# Patient Record
Sex: MALE | Race: WHITE | NOT HISPANIC OR LATINO | Employment: FULL TIME | URBAN - METROPOLITAN AREA
[De-identification: names, ages, dates, MRNs, and addresses within clinical notes are randomized per-mention and may not be internally consistent; named-entity substitution may affect disease eponyms.]

---

## 2017-01-20 ENCOUNTER — ALLSCRIPTS OFFICE VISIT (OUTPATIENT)
Dept: OTHER | Facility: OTHER | Age: 47
End: 2017-01-20

## 2017-01-20 DIAGNOSIS — F31.9 BIPOLAR DISORDER (HCC): ICD-10-CM

## 2017-03-17 ENCOUNTER — TRANSCRIBE ORDERS (OUTPATIENT)
Dept: LAB | Facility: CLINIC | Age: 47
End: 2017-03-17

## 2017-03-17 ENCOUNTER — APPOINTMENT (OUTPATIENT)
Dept: LAB | Facility: CLINIC | Age: 47
End: 2017-03-17
Payer: COMMERCIAL

## 2017-03-17 DIAGNOSIS — F31.9 BIPOLAR DISORDER (HCC): ICD-10-CM

## 2017-03-17 DIAGNOSIS — F31.9 BIPOLAR I DISORDER, MOST RECENT EPISODE (OR CURRENT) UNSPECIFIED: ICD-10-CM

## 2017-03-17 DIAGNOSIS — F31.9 BIPOLAR I DISORDER, MOST RECENT EPISODE (OR CURRENT) UNSPECIFIED: Primary | ICD-10-CM

## 2017-03-17 LAB
ALBUMIN SERPL BCP-MCNC: 3.8 G/DL (ref 3.5–5)
ALP SERPL-CCNC: 90 U/L (ref 46–116)
ALT SERPL W P-5'-P-CCNC: 30 U/L (ref 12–78)
ANION GAP SERPL CALCULATED.3IONS-SCNC: 9 MMOL/L (ref 4–13)
AST SERPL W P-5'-P-CCNC: 16 U/L (ref 5–45)
BASOPHILS # BLD AUTO: 0.02 THOUSANDS/ΜL (ref 0–0.1)
BASOPHILS NFR BLD AUTO: 0 % (ref 0–1)
BILIRUB SERPL-MCNC: 0.4 MG/DL (ref 0.2–1)
BUN SERPL-MCNC: 14 MG/DL (ref 5–25)
CALCIUM SERPL-MCNC: 8.6 MG/DL (ref 8.3–10.1)
CARBAMAZEPINE SERPL-MCNC: 7.3 UG/ML (ref 4–12)
CHLORIDE SERPL-SCNC: 107 MMOL/L (ref 100–108)
CHOLEST SERPL-MCNC: 242 MG/DL (ref 50–200)
CO2 SERPL-SCNC: 26 MMOL/L (ref 21–32)
CREAT SERPL-MCNC: 1.17 MG/DL (ref 0.6–1.3)
EOSINOPHIL # BLD AUTO: 0.3 THOUSAND/ΜL (ref 0–0.61)
EOSINOPHIL NFR BLD AUTO: 4 % (ref 0–6)
ERYTHROCYTE [DISTWIDTH] IN BLOOD BY AUTOMATED COUNT: 12.7 % (ref 11.6–15.1)
GFR SERPL CREATININE-BSD FRML MDRD: >60 ML/MIN/1.73SQ M
GLUCOSE P FAST SERPL-MCNC: 101 MG/DL (ref 65–99)
HCT VFR BLD AUTO: 44.9 % (ref 36.5–49.3)
HDLC SERPL-MCNC: 73 MG/DL (ref 40–60)
HGB BLD-MCNC: 14.8 G/DL (ref 12–17)
LDLC SERPL CALC-MCNC: 159 MG/DL (ref 0–100)
LITHIUM SERPL-SCNC: 0.4 MMOL/L (ref 0.5–1)
LYMPHOCYTES # BLD AUTO: 1.18 THOUSANDS/ΜL (ref 0.6–4.47)
LYMPHOCYTES NFR BLD AUTO: 17 % (ref 14–44)
MCH RBC QN AUTO: 29.7 PG (ref 26.8–34.3)
MCHC RBC AUTO-ENTMCNC: 33 G/DL (ref 31.4–37.4)
MCV RBC AUTO: 90 FL (ref 82–98)
MONOCYTES # BLD AUTO: 0.69 THOUSAND/ΜL (ref 0.17–1.22)
MONOCYTES NFR BLD AUTO: 10 % (ref 4–12)
NEUTROPHILS # BLD AUTO: 4.83 THOUSANDS/ΜL (ref 1.85–7.62)
NEUTS SEG NFR BLD AUTO: 69 % (ref 43–75)
PLATELET # BLD AUTO: 222 THOUSANDS/UL (ref 149–390)
PMV BLD AUTO: 9.7 FL (ref 8.9–12.7)
POTASSIUM SERPL-SCNC: 4.2 MMOL/L (ref 3.5–5.3)
PROT SERPL-MCNC: 7.2 G/DL (ref 6.4–8.2)
RBC # BLD AUTO: 4.99 MILLION/UL (ref 3.88–5.62)
SODIUM SERPL-SCNC: 142 MMOL/L (ref 136–145)
TRIGL SERPL-MCNC: 48 MG/DL
WBC # BLD AUTO: 7.02 THOUSAND/UL (ref 4.31–10.16)

## 2017-03-17 PROCEDURE — 85025 COMPLETE CBC W/AUTO DIFF WBC: CPT

## 2017-03-17 PROCEDURE — 36415 COLL VENOUS BLD VENIPUNCTURE: CPT

## 2017-03-17 PROCEDURE — 80053 COMPREHEN METABOLIC PANEL: CPT

## 2017-03-17 PROCEDURE — 80178 ASSAY OF LITHIUM: CPT

## 2017-03-17 PROCEDURE — 80061 LIPID PANEL: CPT

## 2017-03-17 PROCEDURE — 80156 ASSAY CARBAMAZEPINE TOTAL: CPT

## 2017-03-22 ENCOUNTER — GENERIC CONVERSION - ENCOUNTER (OUTPATIENT)
Dept: OTHER | Facility: OTHER | Age: 47
End: 2017-03-22

## 2017-04-12 ENCOUNTER — ALLSCRIPTS OFFICE VISIT (OUTPATIENT)
Dept: OTHER | Facility: OTHER | Age: 47
End: 2017-04-12

## 2017-07-05 ENCOUNTER — ALLSCRIPTS OFFICE VISIT (OUTPATIENT)
Dept: OTHER | Facility: OTHER | Age: 47
End: 2017-07-05

## 2017-09-27 ENCOUNTER — ALLSCRIPTS OFFICE VISIT (OUTPATIENT)
Dept: OTHER | Facility: OTHER | Age: 47
End: 2017-09-27

## 2017-09-27 DIAGNOSIS — F31.9 BIPOLAR DISORDER (HCC): ICD-10-CM

## 2017-12-27 ENCOUNTER — APPOINTMENT (OUTPATIENT)
Dept: LAB | Facility: CLINIC | Age: 47
End: 2017-12-27
Payer: COMMERCIAL

## 2017-12-27 ENCOUNTER — TRANSCRIBE ORDERS (OUTPATIENT)
Dept: LAB | Facility: CLINIC | Age: 47
End: 2017-12-27

## 2017-12-27 ENCOUNTER — GENERIC CONVERSION - ENCOUNTER (OUTPATIENT)
Dept: OTHER | Facility: OTHER | Age: 47
End: 2017-12-27

## 2017-12-27 DIAGNOSIS — F31.9 DEPRESSED BIPOLAR I DISORDER (HCC): Primary | ICD-10-CM

## 2017-12-27 DIAGNOSIS — F31.9 DEPRESSED BIPOLAR I DISORDER (HCC): ICD-10-CM

## 2017-12-27 DIAGNOSIS — F31.9 BIPOLAR DISORDER (HCC): ICD-10-CM

## 2017-12-27 LAB
ALBUMIN SERPL BCP-MCNC: 3.8 G/DL (ref 3.5–5)
ALP SERPL-CCNC: 76 U/L (ref 46–116)
ALT SERPL W P-5'-P-CCNC: 34 U/L (ref 12–78)
ANION GAP SERPL CALCULATED.3IONS-SCNC: 7 MMOL/L (ref 4–13)
AST SERPL W P-5'-P-CCNC: 16 U/L (ref 5–45)
BASOPHILS # BLD AUTO: 0.01 THOUSANDS/ΜL (ref 0–0.1)
BASOPHILS NFR BLD AUTO: 0 % (ref 0–1)
BILIRUB SERPL-MCNC: 0.4 MG/DL (ref 0.2–1)
BUN SERPL-MCNC: 17 MG/DL (ref 5–25)
CALCIUM SERPL-MCNC: 8.9 MG/DL (ref 8.3–10.1)
CARBAMAZEPINE SERPL-MCNC: 5.8 UG/ML (ref 4–12)
CHLORIDE SERPL-SCNC: 105 MMOL/L (ref 100–108)
CO2 SERPL-SCNC: 28 MMOL/L (ref 21–32)
CREAT SERPL-MCNC: 1.01 MG/DL (ref 0.6–1.3)
EOSINOPHIL # BLD AUTO: 0.31 THOUSAND/ΜL (ref 0–0.61)
EOSINOPHIL NFR BLD AUTO: 5 % (ref 0–6)
ERYTHROCYTE [DISTWIDTH] IN BLOOD BY AUTOMATED COUNT: 12.4 % (ref 11.6–15.1)
GFR SERPL CREATININE-BSD FRML MDRD: 89 ML/MIN/1.73SQ M
GLUCOSE SERPL-MCNC: 123 MG/DL (ref 65–140)
HCT VFR BLD AUTO: 45 % (ref 36.5–49.3)
HGB BLD-MCNC: 14.8 G/DL (ref 12–17)
LITHIUM SERPL-SCNC: 0.3 MMOL/L (ref 0.5–1)
LYMPHOCYTES # BLD AUTO: 1.12 THOUSANDS/ΜL (ref 0.6–4.47)
LYMPHOCYTES NFR BLD AUTO: 19 % (ref 14–44)
MCH RBC QN AUTO: 29.9 PG (ref 26.8–34.3)
MCHC RBC AUTO-ENTMCNC: 32.9 G/DL (ref 31.4–37.4)
MCV RBC AUTO: 91 FL (ref 82–98)
MONOCYTES # BLD AUTO: 0.6 THOUSAND/ΜL (ref 0.17–1.22)
MONOCYTES NFR BLD AUTO: 10 % (ref 4–12)
NEUTROPHILS # BLD AUTO: 3.88 THOUSANDS/ΜL (ref 1.85–7.62)
NEUTS SEG NFR BLD AUTO: 66 % (ref 43–75)
PLATELET # BLD AUTO: 203 THOUSANDS/UL (ref 149–390)
PMV BLD AUTO: 9.7 FL (ref 8.9–12.7)
POTASSIUM SERPL-SCNC: 4.2 MMOL/L (ref 3.5–5.3)
PROT SERPL-MCNC: 7 G/DL (ref 6.4–8.2)
RBC # BLD AUTO: 4.95 MILLION/UL (ref 3.88–5.62)
SODIUM SERPL-SCNC: 140 MMOL/L (ref 136–145)
WBC # BLD AUTO: 5.92 THOUSAND/UL (ref 4.31–10.16)

## 2017-12-27 PROCEDURE — 85025 COMPLETE CBC W/AUTO DIFF WBC: CPT

## 2017-12-27 PROCEDURE — 80178 ASSAY OF LITHIUM: CPT

## 2017-12-27 PROCEDURE — 80053 COMPREHEN METABOLIC PANEL: CPT

## 2017-12-27 PROCEDURE — 36415 COLL VENOUS BLD VENIPUNCTURE: CPT

## 2017-12-27 PROCEDURE — 80156 ASSAY CARBAMAZEPINE TOTAL: CPT

## 2017-12-29 ENCOUNTER — ALLSCRIPTS OFFICE VISIT (OUTPATIENT)
Dept: OTHER | Facility: OTHER | Age: 47
End: 2017-12-29

## 2017-12-29 ENCOUNTER — GENERIC CONVERSION - ENCOUNTER (OUTPATIENT)
Dept: OTHER | Facility: OTHER | Age: 47
End: 2017-12-29

## 2018-01-11 NOTE — PSYCH
Psych Med Mgmt    Appearance: was calm and cooperative and good eye contact  Observed mood: mood appropriate  Observed mood: affect appropriate  Speech: a normal rate  Thought processes: coherent/organized  Hallucinations: no hallucinations present  Thought Content: no delusions  Abnormal Thoughts: The patient has no suicidal thoughts and no homicidal thoughts  Orientation: The patient is oriented to person, place and time  Recent and Remote Memory: short term memory intact and long term memory intact  Attention Span And Concentration: concentration impaired  Insight: Insight intact  Judgment: His judgment was intact  Treatment Recommendations: Follow up in 3 months  Same meds  The patient has been filling controlled prescriptions on time as prescribed to [facility] program       He reports normal appetite, normal energy level and no weight change  Still doing very well  Works every day, no new issues  No new concerns  Family doing well  He is getting along well with wife and children  upbeat, energized and motivated  Vitals  Signs   Recorded: 86ZMQ2488 99:14SH   Systolic: 164  Diastolic: 78  Height: 6 ft   Weight: 289 lb   BMI Calculated: 39 2  BSA Calculated: 2 49    DSM    Provisional Diagnosis: Bipolar Depression with Anxious Feature  GAF--70  Assessment    1  Bipolar affective disorder (296 80) (F31 9)    Plan    1  Amphetamine-Dextroamphet ER 30 MG Oral Capsule Extended Release 24   Hour (Adderall XR); TAKE 1 CAPSULE DAILY FOR ADHD    2  CarBAMazepine 200 MG Oral Tablet; TAKE 1 TABLET TWICE DAILY -MAY CAUSE   DROWSINESS  USE CARE WHENOPERATING A VEHICLE, VESSEL, OR DANGEROUS   MACHINERY   3  Lithium Carbonate 300 MG Oral Capsule; TAKE 1 CAPSULE TWICE DAILY   4  LORazepam 0 5 MG Oral Tablet (Ativan); TAKE 1 TABLET 3 times daily   5  Sertraline HCl - 100 MG Oral Tablet (Zoloft); Take 1 tablet twice daily   6   TraZODone HCl - 100 MG Oral Tablet; TAKE 1 TABLET AT BEDTIME    Active Problems    1  ADD (attention deficit disorder) without hyperactivity (314 00) (F90 0)   2  Bipolar affective disorder (296 80) (F31 9)   3  Encounter for sterilization (V25 2) (Z30 2)   4  Sterilization consult (V25 09) (Z30 09)    Allergies    1  Penicillins    Current Meds   1  Amphetamine-Dextroamphet ER 30 MG Oral Capsule Extended Release 24 Hour; TAKE   1 CAPSULE DAILY FOR ADHD; Therapy: 99QRX1883 to (Evaluate:10Hus9125); Last Rx:89Ias2161 Ordered   2  CarBAMazepine 200 MG Oral Tablet; TAKE 1 TABLET TWICE DAILY -MAY CAUSE   DROWSINESS  USE CARE WHENOPERATING A VEHICLE, VESSEL, OR DANGEROUS   MACHINERY; Therapy: 30BCG6179 to (Garrel Lopez)  Requested for: 38Bim3911; Last   Rx:57Snx1136 Ordered   3  Lithium Carbonate 300 MG Oral Capsule; TAKE 1 CAPSULE TWICE DAILY; Therapy: 02Sep2015 to (Garrel Lopez)  Requested for: 96Zvu2178; Last   Rx:85Qnz6854 Ordered   4  LORazepam 0 5 MG Oral Tablet; TAKE 1 TABLET 3 times daily; Therapy: 81Neb5524 to (Evaluate:51Sda2180); Last Rx:30Tum9704 Ordered   5  Sertraline HCl - 100 MG Oral Tablet; Take 1 tablet twice daily; Therapy: 02Sep2015 to (Garrel Lopez)  Requested for: 96Dca3121; Last   Rx:82Fkl3588 Ordered   6  TraZODone HCl - 100 MG Oral Tablet; TAKE 1 TABLET AT BEDTIME; Therapy: 02Sep2015 to (Garrel Lopez)  Requested for: 64Kye9845; Last   Rx:03Lfz2003 Ordered    Family Psych History  Mother    1  Family history of Hepatitis  Father    2  Family history of Bipolar affective   3  Family history of Gout   4  Family history of High blood pressure  Sister    5  No pertinent family history  Brother    6  No pertinent family history    Social History    · Denied: History of Drug use   · Employed   ·    · Never a smoker   · No alcohol use   · Non-smoker (V41 89) (Z78 9)    End of Encounter Meds    1   Amphetamine-Dextroamphet ER 30 MG Oral Capsule Extended Release 24 Hour (Adderall XR); TAKE 1 CAPSULE DAILY FOR ADHD; Therapy: 34PIC4700 to (Evaluate:27Nov2016); Last Rx:28Oct2016 Ordered    2  CarBAMazepine 200 MG Oral Tablet; TAKE 1 TABLET TWICE DAILY -MAY CAUSE   DROWSINESS  USE CARE WHENOPERATING A VEHICLE, VESSEL, OR DANGEROUS   MACHINERY; Therapy: 05JUX0205 to (Evaluate:26Jan2017)  Requested for: 76XDX5280; Last   Rx:28Oct2016 Ordered   3  Lithium Carbonate 300 MG Oral Capsule; TAKE 1 CAPSULE TWICE DAILY; Therapy: 76Orz3501 to (Evaluate:26Jan2017)  Requested for: 11IYT0101; Last   Rx:28Oct2016 Ordered   4  LORazepam 0 5 MG Oral Tablet (Ativan); TAKE 1 TABLET 3 times daily; Therapy: 02Sep2015 to (Evaluate:26Jan2017); Last Rx:28Oct2016 Ordered   5  Sertraline HCl - 100 MG Oral Tablet (Zoloft); Take 1 tablet twice daily; Therapy: 02Sep2015 to (Evaluate:26Jan2017)  Requested for: 77MJP5122; Last   Rx:28Oct2016 Ordered   6  TraZODone HCl - 100 MG Oral Tablet; TAKE 1 TABLET AT BEDTIME;    Therapy: 02Sep2015 to (Evaluate:26Jan2017)  Requested for: 00NPY4474; Last   Rx:28Oct2016 Ordered    Signatures   Electronically signed by : Ines Mireles; Oct 28 2016  2:36PM EST                       (Author)    Electronically signed by : Anastasia Mckeon MD; Oct 31 2016  3:40PM EST

## 2018-01-12 VITALS
DIASTOLIC BLOOD PRESSURE: 78 MMHG | SYSTOLIC BLOOD PRESSURE: 124 MMHG | BODY MASS INDEX: 39.28 KG/M2 | WEIGHT: 290 LBS | HEIGHT: 72 IN

## 2018-01-12 NOTE — PSYCH
Psych Med Mgmt    Appearance: was calm and cooperative  Observed mood: mood appropriate  Observed mood: affect appropriate  Speech: a normal rate  Thought processes: coherent/organized  Hallucinations: no hallucinations present  Thought Content: no delusions  Abnormal Thoughts: The patient has no suicidal thoughts  Orientation: The patient is oriented to person, place and time  Recent and Remote Memory: short term memory intact and long term memory intact  Attention Span And Concentration: concentration impaired  Insight: Insight intact  Judgment: His judgment was intact  Treatment Recommendations: Follow up in 3 months  He reports normal appetite, normal energy level and no weight change  Doing very well  No mood lability, no episodes of anger, working well with others and at home with family  Tolerates meds well  No adverse effects off Abilify  Will start titration off Lithium and monitor  Check labs  No suicidal, homicidal ideation  Follow up in 3 months  Vitals  Signs [Data Includes: Current Encounter]   Recorded: 52FBP2411 78:18SJ   Systolic: 606  Diastolic: 80  Height: 6 ft   Weight: 300 lb   BMI Calculated: 40 69  BSA Calculated: 2 53    DSM    Provisional Diagnosis: Bipolar Depression no Psychosis    GAF--70  Plan    1  ARIPiprazole 5 MG Oral Tablet (Abilify)    2  Amphetamine-Dextroamphet ER 30 MG Oral Capsule Extended Release 24   Hour (Adderall XR); TAKE 1 CAPSULE DAILY FOR ADHD    3  CarBAMazepine 200 MG Oral Tablet; TAKE 1 TABLET TWICE DAILY   4  Lithium Carbonate 300 MG Oral Capsule; TAKE 1 CAPSULE TWICE DAILY   5  LORazepam 0 5 MG Oral Tablet (Ativan); TAKE 1 TABLET 3 times daily   6  Sertraline HCl - 100 MG Oral Tablet (Zoloft); Take 1 tablet twice daily   7  TraZODone HCl - 100 MG Oral Tablet; TAKE 1 TABLET AT BEDTIME    Active Problems    1  ADD (attention deficit disorder) without hyperactivity (314 00) (F90 0)   2   Bipolar affective disorder (296 80) (F31 9)   3  Encounter for sterilization (V25 2) (Z30 2)   4  Sterilization consult (V25 09) (Z30 09)    Allergies    1  Penicillins    Current Meds   1  Amphetamine-Dextroamphet ER 30 MG Oral Capsule Extended Release 24 Hour; TAKE   1 CAPSULE DAILY FOR ADHD; Therapy: 49CKR5514 to (Evaluate:15Jan2016); Last Rx:92Fme5245 Ordered   2  ARIPiprazole 5 MG Oral Tablet; TAKE 1 TABLET DAILY; Therapy: 70Yoj6275 to (Evaluate:15Mar2016)  Requested for: 60WSF8464; Last   Rx:66Fuj2609 Ordered   3  CarBAMazepine 200 MG Oral Tablet; TAKE 1 TABLET TWICE DAILY; Therapy: 11BTI0843 to (Evaluate:15Mar2016)  Requested for: 15ZEN6735; Last   Rx:69Qib9468 Ordered   4  Lithium Carbonate 300 MG Oral Capsule; TAKE 1 CAPSULE 3 TIMES DAILY; Therapy: 28Trw8794 to (Evaluate:15Mar2016)  Requested for: 90RLF0414; Last   Rx:59Lgz4836 Ordered   5  LORazepam 0 5 MG Oral Tablet; TAKE 1 TABLET 3 times daily; Therapy: 01Rtm9313 to (Evaluate:15Mar2016); Last Rx:41Luy2062 Ordered   6  Sertraline HCl - 100 MG Oral Tablet; Take 1 tablet twice daily; Therapy: 86Vqs5012 to (Evaluate:15Mar2016)  Requested for: 81YSE7970; Last   Rx:01Afg8413 Ordered   7  TraZODone HCl - 100 MG Oral Tablet; TAKE 1 TABLET AT BEDTIME; Therapy: 36Ojy3150 to (Evaluate:15Mar2016)  Requested for: 43YQY4234; Last   Rx:43Qhx7461 Ordered    Family Psych History    1  Family history of Hepatitis    2  Family history of Bipolar affective   3  Family history of Gout   4  Family history of High blood pressure    5  No pertinent family history    6  No pertinent family history    Social History    · Denied: History of Drug use   · Employed   ·    · Never a smoker   · No alcohol use   · Non-smoker (V49 89) (Z78 9)    End of Encounter Meds    1  Amphetamine-Dextroamphet ER 30 MG Oral Capsule Extended Release 24 Hour   (Adderall XR); TAKE 1 CAPSULE DAILY FOR ADHD; Therapy: 55SLV4962 to (Evaluate:58Qld2297); Last Rx:15Jan2016 Ordered    2   CarBAMazepine 200 MG Oral Tablet; TAKE 1 TABLET TWICE DAILY; Therapy: 57EUU7319 to (Evaluate:14Apr2016)  Requested for: 11OJP7422; Last   Rx:15Jan2016 Ordered   3  Lithium Carbonate 300 MG Oral Capsule; TAKE 1 CAPSULE TWICE DAILY; Therapy: 90Eph8126 to (Evaluate:14Apr2016)  Requested for: 09ONG8072; Last   Rx:15Jan2016 Ordered   4  LORazepam 0 5 MG Oral Tablet (Ativan); TAKE 1 TABLET 3 times daily; Therapy: 27Pos6555 to (Evaluate:14Apr2016); Last Rx:15Jan2016 Ordered   5  Sertraline HCl - 100 MG Oral Tablet (Zoloft); Take 1 tablet twice daily; Therapy: 02Sep2015 to (Evaluate:14Apr2016)  Requested for: 50KKC4434; Last   Rx:15Jan2016 Ordered   6  TraZODone HCl - 100 MG Oral Tablet; TAKE 1 TABLET AT BEDTIME;    Therapy: 02Sep2015 to (Evaluate:14Apr2016)  Requested for: 37NHK8667; Last   Rx:15Jan2016 Ordered    Signatures   Electronically signed by : Andrew Long; Ralph 15 2016  2:24PM EST                       (Author)    Electronically signed by : Didier Ralph MD; Jan 18 2016  3:28PM EST

## 2018-01-12 NOTE — PSYCH
Psych Med Mgmt    Appearance: was calm and cooperative and good eye contact  Observed mood: mood appropriate  Observed mood: affect appropriate  Speech: a normal rate  Thought processes: coherent/organized  Hallucinations: no hallucinations present  Thought Content: no delusions  Abnormal Thoughts: The patient has no suicidal thoughts and no homicidal thoughts  Orientation: The patient is oriented to person, place and time  Recent and Remote Memory: short term memory intact and long term memory intact  Attention Span And Concentration: concentration intact  Insight: Insight intact  Judgment: His judgment was intact  Treatment Recommendations: Follow up in 3 months  Same meds  The patient has been filling controlled prescriptions on time as prescribed to [facility] program       He reports normal appetite, normal energy level and no weight change  Doing well mentally  Has energy,interest and focus  Feels happy, robust  No suicidal ideation  No med side effects  Continue current treatment and care  Vitals  Signs   Recorded: 26ZSW0306 18:16KZ   Systolic: 310  Diastolic: 78  Height: 6 ft   Weight: 290 lb   BMI Calculated: 39 33  BSA Calculated: 2 49    DSM    Provisional Diagnosis: Bipolar Depression no Psychosis  GAF--70  Assessment    1  Bipolar affective disorder, currently manic, mild (296 41) (F31 11)    Plan    1  Amphetamine-Dextroamphet ER 30 MG Oral Capsule Extended Release 24   Hour (Adderall XR); TAKE 1 CAPSULE DAILY FOR ADHD    2  CarBAMazepine 200 MG Oral Tablet; TAKE 1 TABLET TWICE DAILY -MAY CAUSE   DROWSINESS  USE CARE WHENOPERATING A VEHICLE, VESSEL, OR DANGEROUS   MACHINERY   3  Lithium Carbonate 300 MG Oral Capsule; TAKE 1 CAPSULE TWICE DAILY   4  LORazepam 0 5 MG Oral Tablet (Ativan); TAKE 1 TABLET 3 times daily   5  Sertraline HCl - 100 MG Oral Tablet (Zoloft); Take 1 tablet twice daily   6   TraZODone HCl - 100 MG Oral Tablet; TAKE 1 TABLET AT BEDTIME    Active Problems    1  ADD (attention deficit disorder) without hyperactivity (314 00) (F98 8)   2  Bipolar affective disorder (296 80) (F31 9)   3  Bipolar affective disorder, currently manic, mild (296 41) (F31 11)   4  Encounter for sterilization (V25 2) (Z30 2)   5  Sterilization consult (V25 09) (Z30 09)    Allergies    1  Penicillins    Current Meds   1  Amphetamine-Dextroamphet ER 30 MG Oral Capsule Extended Release 24 Hour; TAKE   1 CAPSULE DAILY FOR ADHD; Therapy: 36GXL9186 to (Evaluate:57Mfw1673); Last Rx:20Jan2017 Ordered   2  CarBAMazepine 200 MG Oral Tablet; TAKE 1 TABLET TWICE DAILY -MAY CAUSE   DROWSINESS  USE CARE WHENOPERATING A VEHICLE, VESSEL, OR DANGEROUS   MACHINERY; Therapy: 53ODS9973 to (Evaluate:95Ely5923)  Requested for: 86EZN2824; Last   Rx:01Mar2017 Ordered   3  Lithium Carbonate 300 MG Oral Capsule; TAKE 1 CAPSULE TWICE DAILY; Therapy: 75Wzm2264 to (Evaluate:92Xlg4615)  Requested for: 20Jan2017; Last   Rx:20Jan2017 Ordered   4  LORazepam 0 5 MG Oral Tablet; TAKE 1 TABLET 3 times daily; Therapy: 72Zfj4609 to (Evaluate:20Apr2017); Last Rx:20Jan2017 Ordered   5  Sertraline HCl - 100 MG Oral Tablet; Take 1 tablet twice daily; Therapy: 59Afb5960 to (Evaluate:88Gan9912)  Requested for: 20Jan2017; Last   Rx:20Jan2017 Ordered   6  TraZODone HCl - 100 MG Oral Tablet; TAKE 1 TABLET AT BEDTIME; Therapy: 07Umm7663 to (Evaluate:80Kxd7904)  Requested for: 20Jan2017; Last   Rx:20Jan2017 Ordered    Family Psych History  Mother    1  Family history of Hepatitis  Father    2  Family history of Bipolar affective   3  Family history of Gout   4  Family history of High blood pressure  Sister    5  No pertinent family history  Brother    6  No pertinent family history    Social History    · Denied: History of Drug use   · Employed   ·    · Never a smoker   · No alcohol use   · Non-smoker (V49 89) (Z78 9)    End of Encounter Meds    1  Amphetamine-Dextroamphet ER 30 MG Oral Capsule Extended Release 24 Hour   (Adderall XR); TAKE 1 CAPSULE DAILY FOR ADHD; Therapy: 68YAD7418 to (Evaluate:88Tse2253); Last Rx:85Fxd1064 Ordered    2  CarBAMazepine 200 MG Oral Tablet; TAKE 1 TABLET TWICE DAILY -MAY CAUSE   DROWSINESS  USE CARE WHENOPERATING A VEHICLE, VESSEL, OR DANGEROUS   MACHINERY; Therapy: 17NYE1227 to (Evaluate:67Prt1394)  Requested for: 12Apr2017; Last   Rx:23Yvw0251 Ordered   3  Lithium Carbonate 300 MG Oral Capsule; TAKE 1 CAPSULE TWICE DAILY; Therapy: 21Vgf3255 to (Evaluate:24Uqu2664)  Requested for: 93Buf6428; Last   Rx:93Lml0802 Ordered   4  LORazepam 0 5 MG Oral Tablet (Ativan); TAKE 1 TABLET 3 times daily; Therapy: 32Xbi1399 to (Evaluate:75Amy4779); Last Rx:05Ler7117 Ordered   5  Sertraline HCl - 100 MG Oral Tablet (Zoloft); Take 1 tablet twice daily; Therapy: 20Cfe7579 to (Evaluate:33Kqr7526)  Requested for: 12Apr2017; Last   Rx:59Xei2824 Ordered   6  TraZODone HCl - 100 MG Oral Tablet; TAKE 1 TABLET AT BEDTIME; Therapy: 40Tsj0906 to (Evaluate:75Kpg7852)  Requested for: 12Apr2017; Last   Rx:69Yjq7598 Ordered    Signatures   Electronically signed by : Elysia Garber;  Apr 12 2017  3:10PM EST                       (Author)    Electronically signed by : Venu Levi MD; Apr 17 2017  4:49PM EST

## 2018-01-13 VITALS
WEIGHT: 289 LBS | BODY MASS INDEX: 39.14 KG/M2 | HEIGHT: 72 IN | SYSTOLIC BLOOD PRESSURE: 126 MMHG | DIASTOLIC BLOOD PRESSURE: 78 MMHG

## 2018-01-13 NOTE — RESULT NOTES
Verified Results  (1) TEGRETOL(CARBAMAZEPINE) 87NKE9521 07:59AM Sonya Pagan     Test Name Result Flag Reference   CARBAMAZEPINE 7 3 ug/mL  4 0-12 0

## 2018-01-13 NOTE — PSYCH
Psych Med Mgmt    Appearance: was calm and cooperative and good eye contact  Observed mood: mood appropriate  Observed mood: affect appropriate  Speech: a normal rate  Thought processes: coherent/organized  Hallucinations: no hallucinations present  Thought Content: no delusions  Abnormal Thoughts: The patient has no suicidal thoughts and no homicidal thoughts  Orientation: The patient is oriented to person, place and time  Recent and Remote Memory: short term memory intact and long term memory intact  Attention Span And Concentration: concentration intact  Insight: Insight intact  Judgment: His judgment was intact  Treatment Recommendations: Follow up in 3 months  Same meds  The patient has been filling controlled prescriptions on time as prescribed to [facility] 26 program       He reports normal appetite, normal energy level and no weight change  Mood is good  No new concerns or issues  Handling stress much better  Much more appropriately  Family life is good  Continue current treatment  Follow up in 3 months  Vitals  Signs   Recorded: 01TTL7606 22:62GE   Systolic: 157  Diastolic: 78  Height: 6 ft   Weight: 289 lb   BMI Calculated: 39 2  BSA Calculated: 2 49    DSM    Provisional Diagnosis: Bipolar Depression no Psychosis  GAF--70  Assessment    1  Bipolar affective disorder (296 80) (F31 9)    Plan    1  Amphetamine-Dextroamphet ER 30 MG Oral Capsule Extended Release 24   Hour (Adderall XR); TAKE 1 CAPSULE DAILY FOR ADHD    2  CarBAMazepine 200 MG Oral Tablet; TAKE 1 TABLET TWICE DAILY -MAY CAUSE   DROWSINESS  USE CARE WHENOPERATING A VEHICLE, VESSEL, OR DANGEROUS   MACHINERY   3  Lithium Carbonate 300 MG Oral Capsule; TAKE 1 CAPSULE TWICE DAILY   4  LORazepam 0 5 MG Oral Tablet (Ativan); TAKE 1 TABLET 3 times daily   5  Sertraline HCl - 100 MG Oral Tablet (Zoloft); Take 1 tablet twice daily   6   TraZODone HCl - 100 MG Oral Tablet; TAKE 1 TABLET AT BEDTIME   7  (1) CBC/PLT/DIFF; Status:Active; Requested [id number];    8  (1) COMPREHENSIVE METABOLIC PANEL; Status:Active; Requested HQF:90QOR1706;    9  (1) LIPID PANEL, FASTING; Status:Active; Requested BNF:51YFT0558;    10  (1) LITHIUM; Status:Active; Requested KTL:10RAF6907;    11  (Q) CARBAMAZEPINE, TOTAL; Status:Active; Requested LZY:25VFA1343;    12  (Q) CARBAMAZEPINE, TOTAL; Status:Active; Requested VSU:24OJB7250; Active Problems    1  ADD (attention deficit disorder) without hyperactivity (314 00) (F98 8)   2  Bipolar affective disorder (296 80) (F31 9)   3  Bipolar affective disorder, currently manic, mild (296 41) (F31 11)   4  Encounter for sterilization () (Z30 2)   5  Sterilization consult () (Z30 09)    Allergies    1  Penicillins    Current Meds   1  Amphetamine-Dextroamphet ER 30 MG Oral Capsule Extended Release 24 Hour; TAKE   1 CAPSULE DAILY FOR ADHD; Therapy: 98DXL2642 to (Evaluate:2016); Last Rx:2016 Ordered   2  CarBAMazepine 200 MG Oral Tablet; TAKE 1 TABLET TWICE DAILY -MAY CAUSE   DROWSINESS  USE CARE WHENOPERATING A VEHICLE, VESSEL, OR DANGEROUS   MACHINERY; Therapy: 55CTM4897 to (Evaluate:17Wup9443)  Requested for: 00PAN4157; Last   Rx:2016 Ordered   3  Lithium Carbonate 300 MG Oral Capsule; TAKE 1 CAPSULE TWICE DAILY; Therapy: 84Hqx1984 to (Evaluate:2017)  Requested for: 04Uoy1549; Last   Rx:32Wmn9525 Ordered   4  LORazepam 0 5 MG Oral Tablet; TAKE 1 TABLET 3 times daily; Therapy: 34Bnu9230 to (Evaluate:2017); Last Rx:2016 Ordered   5  Sertraline HCl - 100 MG Oral Tablet; Take 1 tablet twice daily; Therapy: 08Utz4534 to (Evaluate:2017)  Requested for: 55CND5092; Last   Rx:2016 Ordered   6  TraZODone HCl - 100 MG Oral Tablet; TAKE 1 TABLET AT BEDTIME; Therapy: 2015 to (Evaluate:2017)  Requested for: 09ZQX3302; Last   Rx:2016 Ordered    Family Psych History  Mother    1   Family history of Hepatitis  Father    2  Family history of Bipolar affective   3  Family history of Gout   4  Family history of High blood pressure  Sister    5  No pertinent family history  Brother    6  No pertinent family history    Social History    · Denied: History of Drug use   · Employed   ·    · Never a smoker   · No alcohol use   · Non-smoker (V49 89) (Z78 9)    End of Encounter Meds    1  Amphetamine-Dextroamphet ER 30 MG Oral Capsule Extended Release 24 Hour   (Adderall XR); TAKE 1 CAPSULE DAILY FOR ADHD; Therapy: 89CVC6105 to (Evaluate:53Izu8565); Last Rx:20Jan2017 Ordered    2  CarBAMazepine 200 MG Oral Tablet; TAKE 1 TABLET TWICE DAILY -MAY CAUSE   DROWSINESS  USE CARE WHENOPERATING A VEHICLE, VESSEL, OR DANGEROUS   MACHINERY; Therapy: 45HHI6203 to (Evaluate:20Apr2017)  Requested for: 20Jan2017; Last   Rx:20Jan2017 Ordered   3  Lithium Carbonate 300 MG Oral Capsule; TAKE 1 CAPSULE TWICE DAILY; Therapy: 43Snk0118 to (Evaluate:20Apr2017)  Requested for: 20Jan2017; Last   Rx:20Jan2017 Ordered   4  LORazepam 0 5 MG Oral Tablet (Ativan); TAKE 1 TABLET 3 times daily; Therapy: 70Ugx0309 to (Evaluate:20Apr2017); Last Rx:20Jan2017 Ordered   5  Sertraline HCl - 100 MG Oral Tablet (Zoloft); Take 1 tablet twice daily; Therapy: 99Mud0459 to (Evaluate:20Apr2017)  Requested for: 20Jan2017; Last   Rx:20Jan2017 Ordered   6  TraZODone HCl - 100 MG Oral Tablet; TAKE 1 TABLET AT BEDTIME;    Therapy: 10Gkt5005 to (Evaluate:20Apr2017)  Requested for: 20Jan2017; Last   Rx:20Jan2017; Status: [address] nilda Bingham Verification Ordered    Signatures   Electronically signed by : Sachin Bynum; Jan 20 2017  2:28PM EST                       (Author)    Electronically signed by : Autumn Masters MD; Jan 23 2017  4:44PM EST

## 2018-01-14 NOTE — PSYCH
Psych Med Mgmt    Appearance: was calm and cooperative and good eye contact  Observed mood: mood appropriate  Observed mood: affect appropriate  Speech: a normal rate  Thought processes: coherent/organized  Hallucinations: no hallucinations present  Thought Content: no delusions  Abnormal Thoughts: The patient has no suicidal thoughts and no homicidal thoughts  Orientation: The patient is oriented to person, place and time  Recent and Remote Memory: short term memory intact and long term memory intact  Attention Span And Concentration: concentration intact  Insight: Insight intact  Judgment: His judgment was intact  He reports normal appetite, normal energy level and no weight change  Mood is good  No new clinical issues or concerns  Still needs bloodwork completed  He says he will do this week or next  Family doing well  He is getting along better with his children  Will continue current meds with no side effects  Functioning well without issue  Vitals  Signs   Recorded: 87ZXC0703 60:93JP   Systolic: 053  Diastolic: 78  Height: 6 ft   Weight: 300 lb   BMI Calculated: 40 69  BSA Calculated: 2 53    DSM    Provisional Diagnosis: Bipolar Depression  GAF--70  Assessment    1  Bipolar affective disorder (296 80) (F31 9)    Plan    1  Amphetamine-Dextroamphet ER 30 MG Oral Capsule Extended Release 24   Hour (Adderall XR); TAKE 1 CAPSULE DAILY FOR ADHD    2  CarBAMazepine 200 MG Oral Tablet; TAKE 1 TABLET TWICE DAILY -MAY CAUSE   DROWSINESS  USE CARE WHENOPERATING A VEHICLE, VESSEL, OR DANGEROUS   MACHINERY   3  Lithium Carbonate 300 MG Oral Capsule; TAKE 1 CAPSULE TWICE DAILY   4  LORazepam 0 5 MG Oral Tablet (Ativan); TAKE 1 TABLET 3 times daily   5  Sertraline HCl - 100 MG Oral Tablet (Zoloft); Take 1 tablet twice daily   6  TraZODone HCl - 100 MG Oral Tablet; TAKE 1 TABLET AT BEDTIME    Active Problems    1   ADD (attention deficit disorder) without hyperactivity (314 00) (F90 0)   2  Bipolar affective disorder (296 80) (F31 9)   3  Encounter for sterilization (V25 2) (Z30 2)   4  Sterilization consult (V25 09) (Z30 09)    Allergies    1  Penicillins    Current Meds   1  Amphetamine-Dextroamphet ER 30 MG Oral Capsule Extended Release 24 Hour; TAKE   1 CAPSULE DAILY FOR ADHD; Therapy: 63HYB0452 to (Evaluate:32Gvc2946); Last Rx:22Jun2016 Ordered   2  CarBAMazepine 200 MG Oral Tablet; TAKE 1 TABLET TWICE DAILY -MAY CAUSE   DROWSINESS  USE CARE WHENOPERATING A VEHICLE, VESSEL, OR DANGEROUS   MACHINERY; Therapy: 17JST9238 to ([place] [facility])  Requested for: 35POG2060; Last   Rx:22Jun2016 Ordered   3  Lithium Carbonate 300 MG Oral Capsule; TAKE 1 CAPSULE TWICE DAILY; Therapy: 70Etf2996 to ([facility])  Requested for: 60SRV6207; Last   Rx:22Jun2016 Ordered   4  LORazepam 0 5 MG Oral Tablet; TAKE 1 TABLET 3 times daily; Therapy: 15Urb8211 to (Evaluate:43Ynp4609); Last Rx:22Jun2016 Ordered   5  Sertraline HCl - 100 MG Oral Tablet; Take 1 tablet twice daily; Therapy: 94Qbv4379 to ([facility])  Requested for: 51ECG6600; Last   Rx:22Jun2016 Ordered   6  TraZODone HCl - 100 MG Oral Tablet; TAKE 1 TABLET AT BEDTIME; Therapy: 02Cvx9968 to ([place] [employer])  Requested for: 22Jun2016; Last   Rx:22Jun2016 Ordered    Family Psych History  Mother    1  Family history of Hepatitis  Father    2  Family history of Bipolar affective   3  Family history of Gout   4  Family history of High blood pressure  Sister    5  No pertinent family history  Brother    6  No pertinent family history    Social History    · Denied: History of Drug use   · Employed   ·    · Never a smoker   · No alcohol use   · Non-smoker (V49 89) (Z78 9)    End of Encounter Meds    1  Amphetamine-Dextroamphet ER 30 MG Oral Capsule Extended Release 24 Hour   (Adderall XR); TAKE 1 CAPSULE DAILY FOR ADHD; Therapy: 60THS6604 to (Evaluate:52Gdv1393); Last Rx:15Tam1777 Ordered    2   CarBAMazepine 200 MG Oral Tablet; TAKE 1 TABLET TWICE DAILY -MAY CAUSE   DROWSINESS  USE CARE WHENOPERATING A VEHICLE, VESSEL, OR DANGEROUS   MACHINERY; Therapy: 32ENZ8586 to (Dorrine Closs)  Requested for: 03Aug2016; Last   Rx:03Aug2016 Ordered   3  Lithium Carbonate 300 MG Oral Capsule; TAKE 1 CAPSULE TWICE DAILY; Therapy: 02Sep2015 to (Dorrine Closs)  Requested for: 03Aug2016; Last   Rx:03Aug2016 Ordered   4  LORazepam 0 5 MG Oral Tablet (Ativan); TAKE 1 TABLET 3 times daily; Therapy: 02Sep2015 to (Evaluate:70Cjp6023); Last Rx:03Aug2016 Ordered   5  Sertraline HCl - 100 MG Oral Tablet (Zoloft); Take 1 tablet twice daily; Therapy: 02Sep2015 to (Dorrine Closs)  Requested for: 03Aug2016; Last   Rx:03Aug2016 Ordered   6  TraZODone HCl - 100 MG Oral Tablet; TAKE 1 TABLET AT BEDTIME; Therapy: 02Sep2015 to (Dorrine Closs)  Requested for: 03Aug2016; Last   Rx:03Aug2016 Ordered    Signatures   Electronically signed by : Lennox Kiel;  Aug  3 2016  2:34PM EST                       (Author)    Electronically signed by : Poncho Hardwick MD; Aug  9 2016  3:17PM EST

## 2018-01-15 NOTE — PSYCH
Psych Med Mgmt    Appearance: was calm and cooperative and good eye contact  Observed mood: mood appropriate  Observed mood: affect appropriate  Speech: a normal rate  Thought processes: coherent/organized  Hallucinations: no hallucinations present  Thought Content: no delusions  Abnormal Thoughts: The patient has no suicidal thoughts and no homicidal thoughts  Orientation: The patient is oriented to person, place and time  Recent and Remote Memory: short term memory intact  Attention Span And Concentration: concentration intact  Insight: Insight intact  Judgment: His judgment was intact  Treatment Recommendations: Follow up in 3 months  He reports normal appetite, normal energy level and no weight change  Mood is good  Less depressed, more hopeful, less helpless    Much improved concentration  no abuse of meds  Very focused at work and performing much better  Continue current treatment  Vitals  Signs [Data Includes: Current Encounter]   Recorded: 95VFI9535 53:61IA   Systolic: 688  Diastolic: 80  Height: 6 ft   Weight: 300 lb   BMI Calculated: 40 69  BSA Calculated: 2 53    DSM    Provisional Diagnosis: Bipolar Depression  ADD  GAF--70  Assessment    1  Bipolar affective disorder (296 80) (F31 9)    Plan    1  Amphetamine-Dextroamphet ER 30 MG Oral Capsule Extended Release 24   Hour (Adderall XR); TAKE 1 CAPSULE DAILY FOR ADHD    2  CarBAMazepine 200 MG Oral Tablet; TAKE 1 TABLET TWICE DAILY -MAY CAUSE   DROWSINESS  USE CARE WHENOPERATING A VEHICLE, VESSEL, OR DANGEROUS   MACHINERY   3  Lithium Carbonate 300 MG Oral Capsule; TAKE 1 CAPSULE TWICE DAILY   4  LORazepam 0 5 MG Oral Tablet (Ativan); TAKE 1 TABLET 3 times daily   5  Sertraline HCl - 100 MG Oral Tablet (Zoloft); Take 1 tablet twice daily   6  TraZODone HCl - 100 MG Oral Tablet; TAKE 1 TABLET AT BEDTIME    Active Problems    1  ADD (attention deficit disorder) without hyperactivity (314 00) (F90 0)   2  Bipolar affective disorder (296 80) (F31 9)   3  Encounter for sterilization (V25 2) (Z30 2)   4  Sterilization consult (V25 09) (Z30 09)    Allergies    1  Penicillins    Current Meds   1  Amphetamine-Dextroamphet ER 30 MG Oral Capsule Extended Release 24 Hour; TAKE   1 CAPSULE DAILY FOR ADHD; Therapy: 25ZLJ5094 to (Evaluate:17Wpb1734); Last Rx:15Jan2016 Ordered   2  CarBAMazepine 200 MG Oral Tablet; TAKE 1 TABLET TWICE DAILY -MAY CAUSE   DROWSINESS  USE CARE WHENOPERATING A VEHICLE, VESSEL, OR DANGEROUS   MACHINERY; Therapy: 87XNU6177 to (Evaluate:69Jcy9371)  Requested for: 35Dtl0669; Last   Rx:08Apr2016 Ordered   3  Lithium Carbonate 300 MG Oral Capsule; TAKE 1 CAPSULE TWICE DAILY; Therapy: 42Zbp8416 to (Evaluate:37Cny9614)  Requested for: 54USK1411; Last   Rx:15Jan2016 Ordered   4  LORazepam 0 5 MG Oral Tablet; TAKE 1 TABLET 3 times daily; Therapy: 91Gno5174 to (Evaluate:05Niv6035); Last Rx:15Jan2016 Ordered   5  Sertraline HCl - 100 MG Oral Tablet; Take 1 tablet twice daily; Therapy: 19Cpa7285 to (Evaluate:36Hzg8071)  Requested for: 28NNY4407; Last   Rx:15Jan2016 Ordered   6  TraZODone HCl - 100 MG Oral Tablet; TAKE 1 TABLET AT BEDTIME; Therapy: 76Pzx2806 to (Evaluate:40Iba2543)  Requested for: 97EEI6707; Last   Rx:15Jan2016 Ordered    Family Psych History    1  Family history of Hepatitis    2  Family history of Bipolar affective   3  Family history of Gout   4  Family history of High blood pressure    5  No pertinent family history    6  No pertinent family history    Social History    · Denied: History of Drug use   · Employed   ·    · Never a smoker   · No alcohol use   · Non-smoker (V49 89) (Z78 9)    End of Encounter Meds    1  Amphetamine-Dextroamphet ER 30 MG Oral Capsule Extended Release 24 Hour   (Adderall XR); TAKE 1 CAPSULE DAILY FOR ADHD; Therapy: 83TNF3286 to (Evaluate:87Mys5988); Last Rx:22Apr2016 Ordered    2   CarBAMazepine 200 MG Oral Tablet; TAKE 1 TABLET TWICE DAILY -MAY CAUSE   DROWSINESS  USE CARE WHENOPERATING A VEHICLE, VESSEL, OR DANGEROUS   MACHINERY; Therapy: 69AED0956 to (Evaluate:72Ebr1729)  Requested for: 22Apr2016; Last   Rx:22Apr2016 Ordered   3  Lithium Carbonate 300 MG Oral Capsule; TAKE 1 CAPSULE TWICE DAILY; Therapy: 02Sep2015 to (Evaluate:71Cva5334)  Requested for: 53Qsp4184; Last   Rx:38Qaf7754 Ordered   4  LORazepam 0 5 MG Oral Tablet (Ativan); TAKE 1 TABLET 3 times daily; Therapy: 85Afs4810 to (Evaluate:94Pvs2653); Last Rx:22Apr2016 Ordered   5  Sertraline HCl - 100 MG Oral Tablet (Zoloft); Take 1 tablet twice daily; Therapy: 02Sep2015 to (Evaluate:53Zkr3502)  Requested for: 22Apr2016; Last   Rx:22Apr2016 Ordered   6  TraZODone HCl - 100 MG Oral Tablet; TAKE 1 TABLET AT BEDTIME; Therapy: 02Sep2015 to (Evaluate:28Hge7556)  Requested for: 22Apr2016; Last   Rx:22Apr2016 Ordered    Signatures   Electronically signed by : Renuka Horta;  Apr 22 2016  4:10PM EST                       (Author)    Electronically signed by : Rocael Byrd MD; Apr 25 2016 10:35AM EST

## 2018-01-17 NOTE — PSYCH
Psych Med Mgmt    Appearance: was calm and cooperative and good eye contact  Observed mood: mood appropriate  Observed mood: affect appropriate  Speech: a normal rate  Thought processes: coherent/organized  Hallucinations: no hallucinations present  Thought Content: no delusions  Abnormal Thoughts: The patient has no suicidal thoughts and no homicidal thoughts  Orientation: The patient is oriented to person, place and time  Recent and Remote Memory: short term memory intact and long term memory intact  Attention Span And Concentration: concentration intact  Insight: Insight intact  Judgment: His judgment was intact  Treatment Recommendations: Follow up in 3 months  Same meds  The patient has been [facility] controlled [facility]       He reports normal appetite, normal energy level and no weight change  Son home from [place] and 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 to current school  Will be going to [employer] for next semester  He does well in Sports, especially baseball  Eleazar Acosta has handled this news fairly well  No new issues, concerns psychiatrically  He does have issues at home with wife and children  I have suggested marital and family counseling  Vitals  Signs   Recorded: 17TNQ3332 27:50JC   Systolic: 913  Diastolic: 76  Height: 6 ft   Weight: 290 lb   BMI Calculated: 39 33  BSA Calculated: 2 49    DSM    Provisional Diagnosis: Bipolar Depression no Psychosis  GAF--65  Assessment    1  Bipolar affective disorder (296 80) (F31 9)    Plan    1  Amphetamine-Dextroamphet ER 30 MG Oral Capsule Extended Release 24   Hour (Adderall XR); TAKE 1 CAPSULE DAILY FOR ADHD    2  CarBAMazepine 200 MG Oral Tablet; TAKE 1 TABLET TWICE DAILY -MAY CAUSE   DROWSINESS  USE CARE WHENOPERATING A VEHICLE, VESSEL, OR DANGEROUS   MACHINERY   3  Lithium Carbonate 300 MG Oral Capsule; take 1 capsule by mouth twice a day   4  LORazepam 0 5 MG Oral Tablet (Ativan); TAKE 1 TABLET 3 times daily   5  Sertraline HCl - 100 MG Oral Tablet (Zoloft); Take 1 tablet twice daily   6  TraZODone HCl - 100 MG Oral Tablet; TAKE 1 TABLET AT BEDTIME    Active Problems    1  ADD (attention deficit disorder) without hyperactivity (314 00) (F98 8)   2  Bipolar affective disorder (296 80) (F31 9)   3  Bipolar affective disorder, currently manic, mild (296 41) (F31 11)   4  Encounter for sterilization (V25 2) (Z30 2)   5  Sterilization consult (V25 09) (Z30 09)    Allergies    1  Penicillins    Current Meds   1  Amphetamine-Dextroamphet ER 30 MG Oral Capsule Extended Release 24 Hour; TAKE   1 CAPSULE DAILY FOR ADHD; Therapy: 92WSI9593 to (Evaluate:48Oqu0388); Last Rx:12Apr2017 Ordered   2  CarBAMazepine 200 MG Oral Tablet; TAKE 1 TABLET TWICE DAILY -MAY CAUSE   DROWSINESS  USE CARE WHENOPERATING A VEHICLE, VESSEL, OR DANGEROUS   MACHINERY; Therapy: 08YDU6279 to (Evaluate:38Ryv4847)  Requested for: 12Apr2017; Last   Rx:12Apr2017 Ordered   3  Lithium Carbonate 300 MG Oral Capsule; take 1 capsule by mouth twice a day; Therapy: 71Paw0232 to (Evaluate:88Kej2546)  Requested for: 03XBY8576; Last   Rx:28Jun2017 Ordered   4  LORazepam 0 5 MG Oral Tablet; TAKE 1 TABLET 3 times daily; Therapy: 78Ovb4191 to (Evaluate:65Jpb5757); Last Rx:94Luz2096 Ordered   5  Sertraline HCl - 100 MG Oral Tablet; Take 1 tablet twice daily; Therapy: 77Tpz2618 to (Evaluate:06Yjc6731)  Requested for: 12Apr2017; Last   Rx:12Apr2017 Ordered   6  TraZODone HCl - 100 MG Oral Tablet; TAKE 1 TABLET AT BEDTIME; Therapy: 27Dlv2993 to (Bill Whitney)  Requested for: 26Apr2017; Last   Rx:89Zbi36 Ordered    Family Psych History  Mother    1  Family history of Hepatitis  Father    2  Family history of Bipolar affective   3  Family history of Gout   4  Family history of High blood pressure  Sister    5  No pertinent family history  Brother    6   No pertinent family history    Social History    · Denied: History of Drug use   · Employed   ·    · Never a smoker   · No alcohol use   · Non-smoker (V49 89) (Z78 9)    End of Encounter Meds    1  Amphetamine-Dextroamphet ER 30 MG Oral Capsule Extended Release 24 Hour   (Adderall XR); TAKE 1 CAPSULE DAILY FOR ADHD; Therapy: 01FNZ0055 to (Evaluate:05Jto4924); Last Rx:52Pno6038 Ordered    2  CarBAMazepine 200 MG Oral Tablet; TAKE 1 TABLET TWICE DAILY -MAY CAUSE   DROWSINESS  USE CARE WHENOPERATING A VEHICLE, VESSEL, OR DANGEROUS   MACHINERY; Therapy: 37XGC5410 to ([phone])  Requested for: 97DSP8708; Last   Rx:24Xaw2276 Ordered   3  Lithium Carbonate 300 MG Oral Capsule; take 1 capsule by mouth twice a day; Therapy: 32Oao6075 to (Evaluate:01Jan2018)  Requested for: 12JGR2160; Last   Rx:06Qzp0095 Ordered   4  LORazepam 0 5 MG Oral Tablet (Ativan); TAKE 1 TABLET 3 times daily; Therapy: 14Xdx3089 to (Evaluate:03Oct2017); Last Rx:49Qob9610 Ordered   5  Sertraline HCl - 100 MG Oral Tablet (Zoloft); Take 1 tablet twice daily; Therapy: 88Nci6612 to ([phone])  Requested for: 09WRB5918; Last   Rx:72Prs4949; Status: ACTIVE - Transmit to [facility] Verification Ordered   6  TraZODone HCl - 100 MG Oral Tablet; TAKE 1 TABLET AT BEDTIME;    Therapy: 86Sid6916 to ([phone])  Requested for: 51FWN9621; Last   Rx:79Yaj7583; Status: [address] to [facility] Verification Ordered    Signatures   Electronically signed by : Burgess Carrillo; Jul 5 2017  3:21PM EST                       (Author)    Electronically signed by : Trenton Latif MD; Jul 5 2017  4:54PM EST

## 2018-01-17 NOTE — PSYCH
Psych Med Mgmt    Appearance: was calm and cooperative and good eye contact  Observed mood: mood appropriate  Observed mood: affect appropriate  Speech: a normal rate  Thought processes: coherent/organized  Hallucinations: no hallucinations present  Thought Content: no delusions  Abnormal Thoughts: The patient has no suicidal thoughts and no homicidal thoughts  Orientation: The patient is oriented to person, place and time  Recent and Remote Memory: short term memory intact and long term memory intact  Attention Span And Concentration: concentration intact  Insight: Insight intact  Judgment: His judgment was intact  Treatment Recommendations: Follow up in 3 months  Same meds  The patient has been filling controlled prescriptions on time as prescribed to [facility]       He reports normal appetite, normal energy level and no weight change  Mood remains stable  No suicidal ideation  Had recent summer vacation with family and all went well  No episodes of negativity  No depression  No mood lability  Has energy, interest and is motivated  Continue current meds, follow up in 3 months  Work also going well  Vitals  Signs   Recorded: 42ZAM4298 76:57TJ   Systolic: 933  Diastolic: 74  Height: 6 ft   Weight: 290 lb   BMI Calculated: 39 33  BSA Calculated: 2 49    DSM    Provisional Diagnosis: Bipolar Depression no Psychosis  GAF--70  Assessment    1  Bipolar affective disorder (296 80) (F31 9)    Plan    1  Amphetamine-Dextroamphet ER 30 MG Oral Capsule Extended Release 24   Hour (Adderall XR); TAKE 1 CAPSULE DAILY FOR ADHD    2  From  LORazepam 0 5 MG Oral Tablet TAKE 1 TABLET 3 times daily To   LORazepam 0 5 MG Oral Tablet (Ativan) Take 1 tablet 4 times daily   3  CarBAMazepine 200 MG Oral Tablet; TAKE 1 TABLET TWICE DAILY -MAY CAUSE   DROWSINESS   USE CARE WHENOPERATING A VEHICLE, VESSEL, OR DANGEROUS   MACHINERY   4  Lithium Carbonate 300 MG Oral Capsule; take 1 capsule by mouth twice a day   5  Sertraline HCl - 100 MG Oral Tablet (Zoloft); Take 1 tablet twice daily   6  TraZODone HCl - 100 MG Oral Tablet; Take one tablet once daily at bedtime   7  (1) CBC/PLT/DIFF; Status:Active; Requested OVN:47COU8485;    8  (1) COMPREHENSIVE METABOLIC PANEL; Status:Active; Requested TRB:17GTV6157;    9  (1) LITHIUM; Status:Active; Requested VKQ:27BRQ2149;    10  (1) TEGRETOL(CARBAMAZEPINE); Status:Active; Requested ZFB:34NOK9715;    11  (Q) CARBAMAZEPINE, TOTAL; Status:Active; Requested QDY:14WIC0010;    12  (Q) CARBAMAZEPINE, TOTAL; Status:Active; Requested GHO:58QDA5880; Active Problems    1  ADD (attention deficit disorder) without hyperactivity (314 00) (F98 8)   2  Bipolar affective disorder (296 80) (F31 9)   3  Bipolar affective disorder, currently manic, mild (296 41) (F31 11)   4  Encounter for sterilization (V25 2) (Z30 2)   5  Sterilization consult (V25 09) (Z30 09)    Allergies    1  Penicillins    Current Meds   1  Amphetamine-Dextroamphet ER 30 MG Oral Capsule Extended Release 24 Hour; TAKE   1 CAPSULE DAILY FOR ADHD; Therapy: 57FDO0548 to (Evaluate:50Kow4945); Last Rx:60Psm1769 Ordered   2  CarBAMazepine 200 MG Oral Tablet; TAKE 1 TABLET TWICE DAILY -MAY CAUSE   DROWSINESS  USE CARE WHENOPERATING A VEHICLE, VESSEL, OR DANGEROUS   MACHINERY; Therapy: 12ZIZ2303 to (Violeta Buitrago)  Requested for: 43DGJ8143; Last   Rx:93Aal7121 Ordered   3  Lithium Carbonate 300 MG Oral Capsule; take 1 capsule by mouth twice a day; Therapy: 41Ucf3704 to (Evaluate:01Jan2018)  Requested for: 82EQH2947; Last   Rx:52Neg6312 Ordered   4  LORazepam 0 5 MG Oral Tablet; TAKE 1 TABLET 3 times daily; Therapy: 37Pcx5254 to (Evaluate:03Oct2017); Last Rx:95Erh8450 Ordered   5  Sertraline HCl - 100 MG Oral Tablet; Take 1 tablet twice daily; Therapy: 54Zyu8300 to (Evaluate:03Oct2017)  Requested for: 52FUY3607; Last   Rx:22Plb9063 Ordered   6  TraZODone HCl - 100 MG Oral Tablet; Take one tablet once daily at bedtime; Therapy: 27Rgw6854 to (Evaluate:03Jev1847)  Requested for: 15ZQZ7024; Last   Rx:10Bev4518 Ordered    Family Psych History  Mother    1  Family history of Hepatitis  Father    2  Family history of Bipolar affective   3  Family history of Gout   4  Family history of High blood pressure  Sister    5  No pertinent family history  Brother    6  No pertinent family history    Social History    · Denied: History of Drug use   · Employed   ·    · Never a smoker   · No alcohol use   · Non-smoker (V49 89) (Z78 9)    End of Encounter Meds    1  Amphetamine-Dextroamphet ER 30 MG Oral Capsule Extended Release 24 Hour   (Adderall XR); TAKE 1 CAPSULE DAILY FOR ADHD; Therapy: 87ZEN9018 to (Denise Starch); Last Rx:40Xui8205 Ordered    2  CarBAMazepine 200 MG Oral Tablet; TAKE 1 TABLET TWICE DAILY -MAY CAUSE   DROWSINESS  USE CARE WHENOPERATING A VEHICLE, VESSEL, OR DANGEROUS   MACHINERY; Therapy: 23VTJ0610 to (Evaluate:24Mit2149)  Requested for: 96YOE1167; Last   Rx:06Gay6682 Ordered   3  Lithium Carbonate 300 MG Oral Capsule; take 1 capsule by mouth twice a day; Therapy: 11Ekg1170 to (Evaluate:45Mxg7079)  Requested for: 13UUY1503; Last   Rx:47Phm0936 Ordered   4  LORazepam 0 5 MG Oral Tablet (Ativan); Take 1 tablet 4 times daily; Therapy: 67Bxx3861 to (Evaluate:13Nrt1138); Last Rx:18Miu9944 Ordered   5  Sertraline HCl - 100 MG Oral Tablet (Zoloft); Take 1 tablet twice daily; Therapy: 52Imy7550 to (Evaluate:95Vua1887)  Requested for: 10XPU5159; Last   Rx:26Qdn5930 Ordered   6  TraZODone HCl - 100 MG Oral Tablet; Take one tablet once daily at bedtime;    Therapy: 76Jjd4563 to (Evaluate:96Rra0311)  Requested for: 08QHS0870; Last   UE:71AYU7155 Ordered    Signatures   Electronically signed by : Ines Mireles; Sep 27 2017  2:45PM EST                       (Author)    Electronically signed by : Anastasia Mckeon MD; Sep 27 2017 5:16PM EST

## 2018-01-22 VITALS
SYSTOLIC BLOOD PRESSURE: 120 MMHG | DIASTOLIC BLOOD PRESSURE: 74 MMHG | BODY MASS INDEX: 39.28 KG/M2 | HEIGHT: 72 IN | WEIGHT: 290 LBS

## 2018-01-22 VITALS
DIASTOLIC BLOOD PRESSURE: 76 MMHG | SYSTOLIC BLOOD PRESSURE: 122 MMHG | BODY MASS INDEX: 39.28 KG/M2 | HEIGHT: 72 IN | WEIGHT: 290 LBS

## 2018-01-23 NOTE — PSYCH
Psych Med Mgmt    Appearance: was calm and cooperative and good eye contact  Observed mood: mood appropriate  Observed mood: affect appropriate  Speech: a normal rate  Thought processes: coherent/organized  Hallucinations: no hallucinations present  Thought Content: no delusions  Abnormal Thoughts: The patient has no suicidal thoughts and no homicidal thoughts  Orientation: The patient is oriented to person, place and time  Recent and Remote Memory: short term memory intact and long term memory intact  Attention Span And Concentration: concentration intact  Insight: Insight intact  Judgment: His judgment was intact  Treatment Recommendations: Follow up in 3 months  Same meds  The patient has been filling controlled prescriptions on time as prescribed to [facility] program       He reports normal appetite, normal energy level and no weight change  All is well  Work and home life is all going well    Has energy, interest and motivation  Getting along better with others  More social with others  No thoughts of self harm  Much more animated  Happy  Very interested in his salt water aquarium and keeping it ecologically sound  Vitals  Signs   Recorded: 43OYN9544 11:61IX   Systolic: 118  Diastolic: 76  Height: 6 ft   Weight: 290 lb   BMI Calculated: 39 33  BSA Calculated: 2 49    DSM    Provisional Diagnosis: Bipolar Depression  ADD  GAF--70  Assessment    1  Bipolar affective disorder (296 80) (F31 9)    Plan    1  Amphetamine-Dextroamphet ER 30 MG Oral Capsule Extended Release 24   Hour (Adderall XR); TAKE 1 CAPSULE DAILY FOR ADHD    2  CarBAMazepine 200 MG Oral Tablet; TAKE 1 TABLET TWICE DAILY -MAY CAUSE   DROWSINESS  USE CARE WHEN OPERATING A VEHICLE, VESSEL, OR DANGEROUS   MACHINERY   3  Lithium Carbonate 300 MG Oral Capsule; take 1 capsule by mouth twice a day   4  Sertraline HCl - 100 MG Oral Tablet (Zoloft);  Take 1 tablet twice daily   5  TraZODone HCl - 100 MG Oral Tablet; Take one tablet once daily at bedtime    Active Problems    1  ADD (attention deficit disorder) without hyperactivity (314 00) (F98 8)   2  Bipolar affective disorder (296 80) (F31 9)   3  Bipolar affective disorder, currently manic, mild (296 41) (F31 11)   4  Encounter for sterilization (V25 2) (Z30 2)   5  Sterilization consult (V25 09) (Z30 09)    Allergies    1  Penicillins    Current Meds   1  Amphetamine-Dextroamphet ER 30 MG Oral Capsule Extended Release 24 Hour; TAKE   1 CAPSULE DAILY FOR ADHD; Therapy: 40KZU6362 to (Evaluate:12Jan2018); Last Rx:12Wgs0955 Ordered   2  CarBAMazepine 200 MG Oral Tablet; TAKE 1 TABLET TWICE DAILY -MAY CAUSE   DROWSINESS  USE CARE WHEN OPERATING A VEHICLE, VESSEL, OR DANGEROUS   MACHINERY; Therapy: 12AWE1324 to ([phone])  Requested for: 14Tdl4365; Last   Rx:04Wba2013 Ordered   3  Lithium Carbonate 300 MG Oral Capsule; take 1 capsule by mouth twice a day; Therapy: 86Qbb7920 to (Shirley Molina)  Requested for: 85VQB5069; Last   Rx:13Ovh0708 Ordered   4  LORazepam 0 5 MG Oral Tablet; Take 1 tablet 4 times daily; Therapy: 53Dmb3829 to (Evaluate:85Avv9620); Last Rx:35Gpo4208 Ordered   5  Sertraline HCl - 100 MG Oral Tablet; Take 1 tablet twice daily; Therapy: 52Bmj4793 to (Evaluate:59Itg2269)  Requested for: 40Rhe1319; Last   Rx:48Njb2509 Ordered   6  TraZODone HCl - 100 MG Oral Tablet; Take one tablet once daily at bedtime; Therapy: 09Ejr5017 to (Evaluate:21Puu1186)  Requested for: 65HTM9389; Last   Rx:90Hti2156 Ordered    Family Psych History  Mother    1  Family history of Hepatitis  Father    2  Family history of Bipolar affective   3  Family history of Gout   4  Family history of High blood pressure  Sister    5  No pertinent family history  Brother    6   No pertinent family history    Social History    · Denied: History of Drug use   · Employed   ·    · Never a smoker   · No alcohol use   · Non-smoker (V49 89) (Z78 9)    End of Encounter Meds    1  Amphetamine-Dextroamphet ER 30 MG Oral Capsule Extended Release 24 Hour   (Adderall XR); TAKE 1 CAPSULE DAILY FOR ADHD; Therapy: 91NYB8034 to (Evaluate:28Jan2018); Last Rx:68Ins3469 Ordered    2  CarBAMazepine 200 MG Oral Tablet; TAKE 1 TABLET TWICE DAILY -MAY CAUSE   DROWSINESS  USE CARE WHEN OPERATING A VEHICLE, VESSEL, OR DANGEROUS   MACHINERY; Therapy: 55NBM0717 to (Evaluate:27Jun2018)  Requested for: 40Ruz7887; Last   Rx:44Icn9657 Ordered   3  Lithium Carbonate 300 MG Oral Capsule; take 1 capsule by mouth twice a day; Therapy: 02Sep2015 to (Evaluate:27Jun2018)  Requested for: 81Lgv4746; Last   Rx:40Xsw5572 Ordered   4  LORazepam 0 5 MG Oral Tablet (Ativan); Take 1 tablet 4 times daily; Therapy: 02Sep2015 to (Evaluate:71Gsb7152); Last Rx:27Sep2017 Ordered   5  Sertraline HCl - 100 MG Oral Tablet (Zoloft); Take 1 tablet twice daily; Therapy: 02Sep2015 to (Evaluate:27Jun2018)  Requested for: 50Gwh3408; Last   Rx:29Cse8719 Ordered   6  TraZODone HCl - 100 MG Oral Tablet; Take one tablet once daily at bedtime;    Therapy: 02Sep2015 to (Evaluate:27Jun2018)  Requested for: 23BUF2552; Last   Rx:78Bem8761 Ordered    Signatures   Electronically signed by : Andry Azevedo; Dec 29 2017  2:49PM EST                       (Author)    Electronically signed by : Duong Gonzales MD; Jan 2 2018  3:21PM EST

## 2018-01-23 NOTE — RESULT NOTES
Verified Results  (1) CBC/PLT/DIFF 01Shx0291 07:29AM Rachel Aguirre    Order Number: [id number]     Test Name Result Flag Reference   WBC COUNT 5 92 Thousand/uL  4 31-10 16   RBC COUNT 4 95 Million/uL  3 88-5 62   HEMOGLOBIN 14 8 g/dL  12 0-17 0   HEMATOCRIT 45 0 %  36 5-49 3   MCV 91 fL  82-98   MCH 29 9 pg  26 8-34 3   MCHC 32 9 g/dL  31 4-37 4   RDW 12 4 %  11 6-15 1   MPV 9 7 fL  8 9-12 7   PLATELET COUNT 948 Thousands/uL  149-390   NEUTROPHILS RELATIVE PERCENT 66 %  43-75   LYMPHOCYTES RELATIVE PERCENT 19 %  14-44   MONOCYTES RELATIVE PERCENT 10 %  4-12   EOSINOPHILS RELATIVE PERCENT 5 %  0-6   BASOPHILS RELATIVE PERCENT 0 %  0-1   NEUTROPHILS ABSOLUTE COUNT 3 88 Thousands/? ??L  1 85-7 62   LYMPHOCYTES ABSOLUTE COUNT 1 12 Thousands/? ??L  0 60-4 47   MONOCYTES ABSOLUTE COUNT 0 60 Thousand/? ??L  0 17-1 22   EOSINOPHILS ABSOLUTE COUNT 0 31 Thousand/? ??L  0 00-0 61   BASOPHILS ABSOLUTE COUNT 0 01 Thousands/? ??L  0 00-0 10     (1) COMPREHENSIVE METABOLIC PANEL 14IQO2266 14:49MY Rachel Aguirre    Order Number: [id number]     Test Name Result Flag Reference   GLUCOSE,RANDM 123 mg/dL     If the patient is fasting, the ADA then defines impaired fasting glucose as > 100 mg/dL and diabetes as > or equal to 123 mg/dL  Specimen collection should occur prior to Sulfasalazine administration due to the potential for falsely depressed results  Specimen collection should occur prior to Sulfapyridine administration due to the potential for falsely elevated results     SODIUM 140 mmol/L  136-145   POTASSIUM 4 2 mmol/L  3 5-5 3   CHLORIDE 105 mmol/L  100-108   CARBON DIOXIDE 28 mmol/L  21-32   ANION GAP (CALC) 7 mmol/L  4-13   BLOOD UREA NITROGEN 17 mg/dL  5-25   CREATININE 1 01 mg/dL  0 60-1 30   Standardized to IDMS reference method   CALCIUM 8 9 mg/dL  8 3-10 1   BILI, TOTAL 0 40 mg/dL  0 20-1 00   ALK PHOSPHATAS 76 U/L     ALT (SGPT) 34 U/L  12-78   Specimen collection should occur prior to Sulfasalazine administration due to the potential for falsely depressed results  AST(SGOT) 16 U/L  5-45   Specimen collection should occur prior to Sulfasalazine administration due to the potential for falsely depressed results  ALBUMIN 3 8 g/dL  3 5-5 0   TOTAL PROTEIN 7 0 g/dL  6 4-8 2   eGFR 89 ml/min/1 73sq m     National Kidney Disease Education Program recommendations are as follows:  GFR calculation is accurate only with a steady state creatinine  Chronic Kidney disease less than 60 ml/min/1 73 sq  meters  Kidney failure less than 15 ml/min/1 73 sq  meters       (1) LITHIUM 28Snk0760 07:29AM [employer] Order Number: [id number]     Test Name Result Flag Reference   LITHIUM 0 3 mmol/L L 0 5-1 0     (1) TEGRETOL(CARBAMAZEPINE) 07RZH6526 07:29AM [employer] Order Number: [id number]     Test Name Result Flag Reference   CARBAMAZEPINE 5 8 ug/mL  4 0-12 0

## 2018-01-24 VITALS
SYSTOLIC BLOOD PRESSURE: 122 MMHG | BODY MASS INDEX: 39.28 KG/M2 | HEIGHT: 72 IN | WEIGHT: 290 LBS | DIASTOLIC BLOOD PRESSURE: 76 MMHG

## 2018-03-23 ENCOUNTER — OFFICE VISIT (OUTPATIENT)
Dept: PSYCHIATRY | Facility: CLINIC | Age: 48
End: 2018-03-23
Payer: COMMERCIAL

## 2018-03-23 DIAGNOSIS — F98.8 ATTENTION DEFICIT DISORDER (ADD) WITHOUT HYPERACTIVITY: ICD-10-CM

## 2018-03-23 DIAGNOSIS — F31.9 BIPOLAR DISORDER WITH DEPRESSION (HCC): Primary | ICD-10-CM

## 2018-03-23 PROCEDURE — 99214 OFFICE O/P EST MOD 30 MIN: CPT | Performed by: NURSE PRACTITIONER

## 2018-03-23 RX ORDER — BUPROPION HYDROCHLORIDE 100 MG/1
100 TABLET, EXTENDED RELEASE ORAL DAILY
Qty: 30 TABLET | Refills: 2 | Status: SHIPPED | OUTPATIENT
Start: 2018-03-23 | End: 2018-05-18 | Stop reason: SDUPTHER

## 2018-03-23 RX ORDER — LITHIUM CARBONATE 300 MG/1
1 CAPSULE ORAL 2 TIMES DAILY
COMMUNITY
Start: 2015-09-02 | End: 2018-03-23 | Stop reason: SDUPTHER

## 2018-03-23 RX ORDER — LITHIUM CARBONATE 300 MG/1
300 CAPSULE ORAL 2 TIMES DAILY
Qty: 60 CAPSULE | Refills: 2 | Status: SHIPPED | OUTPATIENT
Start: 2018-03-23 | End: 2018-05-18 | Stop reason: SDUPTHER

## 2018-03-23 RX ORDER — LORAZEPAM 0.5 MG/1
0.5 TABLET ORAL 4 TIMES DAILY
Qty: 30 TABLET | Refills: 2 | Status: SHIPPED | OUTPATIENT
Start: 2018-03-23 | End: 2018-05-18 | Stop reason: SDUPTHER

## 2018-03-23 RX ORDER — DEXTROAMPHETAMINE SACCHARATE, AMPHETAMINE ASPARTATE MONOHYDRATE, DEXTROAMPHETAMINE SULFATE AND AMPHETAMINE SULFATE 7.5; 7.5; 7.5; 7.5 MG/1; MG/1; MG/1; MG/1
30 CAPSULE, EXTENDED RELEASE ORAL DAILY
Qty: 30 CAPSULE | Refills: 0 | Status: SHIPPED | OUTPATIENT
Start: 2018-03-23 | End: 2018-03-23 | Stop reason: SDUPTHER

## 2018-03-23 RX ORDER — DEXTROAMPHETAMINE SACCHARATE, AMPHETAMINE ASPARTATE MONOHYDRATE, DEXTROAMPHETAMINE SULFATE AND AMPHETAMINE SULFATE 7.5; 7.5; 7.5; 7.5 MG/1; MG/1; MG/1; MG/1
30 CAPSULE, EXTENDED RELEASE ORAL DAILY
Qty: 30 CAPSULE | Refills: 0 | Status: SHIPPED | OUTPATIENT
Start: 2018-03-23 | End: 2018-05-18 | Stop reason: SDUPTHER

## 2018-03-23 RX ORDER — LORAZEPAM 0.5 MG/1
0.5 TABLET ORAL 4 TIMES DAILY
Refills: 2 | COMMUNITY
Start: 2018-02-08 | End: 2018-03-23 | Stop reason: SDUPTHER

## 2018-03-23 RX ORDER — CARBAMAZEPINE 200 MG/1
200 TABLET ORAL 2 TIMES DAILY
Refills: 1 | COMMUNITY
Start: 2018-02-26 | End: 2018-03-23 | Stop reason: SDUPTHER

## 2018-03-23 RX ORDER — CARBAMAZEPINE 200 MG/1
200 TABLET ORAL 2 TIMES DAILY
Qty: 60 TABLET | Refills: 2 | Status: SHIPPED | OUTPATIENT
Start: 2018-03-23 | End: 2018-05-18 | Stop reason: SDUPTHER

## 2018-03-23 RX ORDER — SERTRALINE HYDROCHLORIDE 100 MG/1
100 TABLET, FILM COATED ORAL DAILY
Qty: 30 TABLET | Refills: 2 | Status: SHIPPED | OUTPATIENT
Start: 2018-03-23 | End: 2018-03-23 | Stop reason: SDUPTHER

## 2018-03-23 RX ORDER — SERTRALINE HYDROCHLORIDE 100 MG/1
100 TABLET, FILM COATED ORAL 2 TIMES DAILY
Refills: 5 | COMMUNITY
Start: 2018-03-18 | End: 2018-03-23 | Stop reason: SDUPTHER

## 2018-03-23 RX ORDER — SERTRALINE HYDROCHLORIDE 100 MG/1
100 TABLET, FILM COATED ORAL DAILY
Qty: 30 TABLET | Refills: 0 | Status: SHIPPED | OUTPATIENT
Start: 2018-03-23 | End: 2018-05-18 | Stop reason: SDUPTHER

## 2018-03-23 RX ORDER — DEXTROAMPHETAMINE SACCHARATE, AMPHETAMINE ASPARTATE MONOHYDRATE, DEXTROAMPHETAMINE SULFATE AND AMPHETAMINE SULFATE 7.5; 7.5; 7.5; 7.5 MG/1; MG/1; MG/1; MG/1
1 CAPSULE, EXTENDED RELEASE ORAL DAILY
COMMUNITY
Start: 2015-12-16 | End: 2018-03-23 | Stop reason: SDUPTHER

## 2018-03-23 RX ORDER — TRAZODONE HYDROCHLORIDE 100 MG/1
1 TABLET ORAL
COMMUNITY
Start: 2015-09-02 | End: 2018-03-23 | Stop reason: SDUPTHER

## 2018-03-23 RX ORDER — TRAZODONE HYDROCHLORIDE 100 MG/1
100 TABLET ORAL
Qty: 30 TABLET | Refills: 2 | Status: SHIPPED | OUTPATIENT
Start: 2018-03-23 | End: 2018-05-18 | Stop reason: SDUPTHER

## 2018-03-23 NOTE — PATIENT INSTRUCTIONS
Discussed sexual side effects and how this can occur with psych meds  Will decrease Zoloft and add Wellbutrin and monitor

## 2018-03-23 NOTE — PSYCH
PROGRESS NOTE        [facility]      Name and Date of Birth:  Junior Judy PEOPLES 41 y o  1970    Date of Visit: 03/23/18    SUBJECTIVE:  Doing well mentally  Has no depression, no mood fluctuations  Psychiatrically, all is well  Only complaint is increased sexual side effects directly related to meds  Will review and adjust     Raymondo Najjar continues to feel better since the last visit  He denies significant depressive symptoms, continues to do well  He denies significant depressive symptoms  na    He denies suicidal ideation, intent or plan at present, has no suicidal ideation, intent or plan at present  He denies any auditory hallucinations and visual hallucinations, denies any other delusional thinking, denies any delusional thinking  He denies any side effects from medications    HPI ROS Appetite Changes and Sleep: normal appetite, normal sleep, now with sexual side effects  More dysfunction  Review Of Systems:      Constitutional Negative   ENT Negative   Cardiovascular Negative   Respiratory As Noted in HPI   Gastrointestinal Negative   Genitourinary Negative   Musculoskeletal Negative   Integumentary Negative   Neurological Negative   Endocrine Negative   Other Symptoms Negative and None       Laboratory Results: No results found for this or any previous visit  Substance Abuse History:    History   Drug Use No       Family Psychiatric History:     Family History   Problem Relation Age of Onset    Alcohol abuse Father     Bipolar disorder Father        The following portions of the patient's history were reviewed and updated as appropriate: past family history, past medical history, past social history, past surgical history and problem list     Social History     Social History    Marital status: /Civil Union     Spouse name: N/A    Number of children: N/A    Years of education: N/A     Occupational History    Not on file  Social History Main Topics    Smoking status: Never Smoker    Smokeless tobacco: Never Used    Alcohol use No    Drug use: No    Sexual activity: Not on file     Other Topics Concern    Not on file     Social History Narrative    No narrative on file     Social History     Social History Narrative    No narrative on file        Social History     Tobacco History     Smoking Status  Never Smoker    Smokeless Tobacco Use  Never Used          Alcohol History     Alcohol Use Status  No          Drug Use     Drug Use Status  No          Sexual Activity     Sexually Active  Not Asked          Activities of Daily Living    Not Asked                     OBJECTIVE:     Mental Status Evaluation:    Appearance age appropriate, casually dressed   Behavior pleasant, cooperative   Speech normal volume, normal pitch   Mood improved   Affect brighter   Thought Processes logical   Associations intact associations   Thought Content normal   Perceptual Disturbances: none   Abnormal Thoughts  Risk Potential Suicidal ideation - None  Homicidal ideation - None  Potential for aggression - Yes   Orientation oriented to person, place, time/date and situation   Memory recent and remote memory grossly intact   Cosciousness alert and awake   Attention Span attention span and concentration are age appropriate   Intellect Appears to be of Average Intelligence   Insight age appropriate and improved   Judgement good and improved   Muscle Strength and  Gait muscle strength and tone were normal   Language no difficulty naming common objects   Fund of Knowledge displays adequate knowledge of current events   Pain none   Pain Scale 2       Assessment/Plan:       There are no diagnoses linked to this encounter        Treatment Recommendations/Precautions:  Decrease Zoloft 100mg and add Wellbutrin SR  100mg  daily    Continue current medications:Risks/Benefits      Risks, Benefits And Possible Side Effects Of Medications:    Risks, benefits, and possible side effects of medications explained to patient and patient verbalizes understanding and agreement for treatment  Controlled Medication Discussion:  Discussed appropriate use of taking meeds accordingly    Not applicable    Psychotherapy Provided: Yes  Discussed sexual side effects that may occur from meds      Individual psychotherapy provided: No

## 2018-05-15 ENCOUNTER — APPOINTMENT (OUTPATIENT)
Dept: LAB | Facility: CLINIC | Age: 48
End: 2018-05-15
Payer: COMMERCIAL

## 2018-05-15 DIAGNOSIS — F31.9 BIPOLAR DISORDER WITH DEPRESSION (HCC): ICD-10-CM

## 2018-05-15 LAB
ALBUMIN SERPL BCP-MCNC: 3.5 G/DL (ref 3.5–5)
ALP SERPL-CCNC: 70 U/L (ref 46–116)
ALT SERPL W P-5'-P-CCNC: 33 U/L (ref 12–78)
ANION GAP SERPL CALCULATED.3IONS-SCNC: 8 MMOL/L (ref 4–13)
AST SERPL W P-5'-P-CCNC: 16 U/L (ref 5–45)
BILIRUB SERPL-MCNC: 0.2 MG/DL (ref 0.2–1)
BUN SERPL-MCNC: 13 MG/DL (ref 5–25)
CALCIUM SERPL-MCNC: 8.4 MG/DL (ref 8.3–10.1)
CARBAMAZEPINE SERPL-MCNC: 5.6 UG/ML (ref 4–12)
CHLORIDE SERPL-SCNC: 110 MMOL/L (ref 100–108)
CO2 SERPL-SCNC: 27 MMOL/L (ref 21–32)
CREAT SERPL-MCNC: 0.93 MG/DL (ref 0.6–1.3)
GFR SERPL CREATININE-BSD FRML MDRD: 99 ML/MIN/1.73SQ M
GLUCOSE SERPL-MCNC: 101 MG/DL (ref 65–140)
LITHIUM SERPL-SCNC: 0.3 MMOL/L (ref 0.5–1)
POTASSIUM SERPL-SCNC: 3.8 MMOL/L (ref 3.5–5.3)
PROT SERPL-MCNC: 6.5 G/DL (ref 6.4–8.2)
SODIUM SERPL-SCNC: 145 MMOL/L (ref 136–145)
T4 FREE SERPL-MCNC: 0.69 NG/DL (ref 0.76–1.46)
TSH SERPL DL<=0.05 MIU/L-ACNC: 3.79 UIU/ML (ref 0.36–3.74)

## 2018-05-15 PROCEDURE — 84443 ASSAY THYROID STIM HORMONE: CPT

## 2018-05-15 PROCEDURE — 80053 COMPREHEN METABOLIC PANEL: CPT

## 2018-05-15 PROCEDURE — 36415 COLL VENOUS BLD VENIPUNCTURE: CPT

## 2018-05-15 PROCEDURE — 80156 ASSAY CARBAMAZEPINE TOTAL: CPT

## 2018-05-15 PROCEDURE — 84439 ASSAY OF FREE THYROXINE: CPT

## 2018-05-15 PROCEDURE — 80178 ASSAY OF LITHIUM: CPT

## 2018-05-18 ENCOUNTER — OFFICE VISIT (OUTPATIENT)
Dept: PSYCHIATRY | Facility: CLINIC | Age: 48
End: 2018-05-18
Payer: COMMERCIAL

## 2018-05-18 DIAGNOSIS — F98.8 ADD (ATTENTION DEFICIT DISORDER) WITHOUT HYPERACTIVITY: ICD-10-CM

## 2018-05-18 DIAGNOSIS — F31.9 BIPOLAR DISORDER WITH DEPRESSION (HCC): Primary | ICD-10-CM

## 2018-05-18 DIAGNOSIS — F98.8 ATTENTION DEFICIT DISORDER (ADD) WITHOUT HYPERACTIVITY: ICD-10-CM

## 2018-05-18 PROCEDURE — 99213 OFFICE O/P EST LOW 20 MIN: CPT | Performed by: PSYCHIATRY & NEUROLOGY

## 2018-05-18 RX ORDER — TRAZODONE HYDROCHLORIDE 100 MG/1
100 TABLET ORAL
Qty: 30 TABLET | Refills: 5 | Status: SHIPPED | OUTPATIENT
Start: 2018-05-18 | End: 2018-10-31 | Stop reason: SDUPTHER

## 2018-05-18 RX ORDER — DEXTROAMPHETAMINE SACCHARATE, AMPHETAMINE ASPARTATE MONOHYDRATE, DEXTROAMPHETAMINE SULFATE AND AMPHETAMINE SULFATE 7.5; 7.5; 7.5; 7.5 MG/1; MG/1; MG/1; MG/1
30 CAPSULE, EXTENDED RELEASE ORAL DAILY
Qty: 30 CAPSULE | Refills: 0 | Status: SHIPPED | OUTPATIENT
Start: 2018-05-18 | End: 2018-05-18 | Stop reason: SDUPTHER

## 2018-05-18 RX ORDER — CARBAMAZEPINE 200 MG/1
200 TABLET ORAL 2 TIMES DAILY
Qty: 60 TABLET | Refills: 5 | Status: SHIPPED | OUTPATIENT
Start: 2018-05-18 | End: 2018-10-31 | Stop reason: SDUPTHER

## 2018-05-18 RX ORDER — DEXTROAMPHETAMINE SACCHARATE, AMPHETAMINE ASPARTATE MONOHYDRATE, DEXTROAMPHETAMINE SULFATE AND AMPHETAMINE SULFATE 7.5; 7.5; 7.5; 7.5 MG/1; MG/1; MG/1; MG/1
30 CAPSULE, EXTENDED RELEASE ORAL DAILY
Qty: 30 CAPSULE | Refills: 0 | Status: SHIPPED | OUTPATIENT
Start: 2018-05-18 | End: 2018-10-31 | Stop reason: SDUPTHER

## 2018-05-18 RX ORDER — LITHIUM CARBONATE 300 MG/1
300 CAPSULE ORAL 2 TIMES DAILY
Qty: 60 CAPSULE | Refills: 5 | Status: SHIPPED | OUTPATIENT
Start: 2018-05-18 | End: 2018-10-31 | Stop reason: SDUPTHER

## 2018-05-18 RX ORDER — SERTRALINE HYDROCHLORIDE 100 MG/1
100 TABLET, FILM COATED ORAL DAILY
Qty: 30 TABLET | Refills: 5 | Status: SHIPPED | OUTPATIENT
Start: 2018-05-18 | End: 2018-10-31 | Stop reason: ALTCHOICE

## 2018-05-18 RX ORDER — LORAZEPAM 0.5 MG/1
0.5 TABLET ORAL 4 TIMES DAILY
Qty: 120 TABLET | Refills: 2 | Status: SHIPPED | OUTPATIENT
Start: 2018-05-18 | End: 2018-10-19 | Stop reason: SDUPTHER

## 2018-05-18 RX ORDER — BUPROPION HYDROCHLORIDE 100 MG/1
100 TABLET, EXTENDED RELEASE ORAL DAILY
Qty: 30 TABLET | Refills: 5 | Status: SHIPPED | OUTPATIENT
Start: 2018-05-18 | End: 2018-10-31 | Stop reason: SDUPTHER

## 2018-05-18 NOTE — PSYCH
PROGRESS NOTE        [facility]      Name and Date of Birth:  Mikayla Sherman IV 41 y o  1970    Date of Visit: 05/18/18    SUBJECTIVE:    Dae Howard continues to feel better since the last visit  He denies significant depressive symptoms, continues to do well  Less sexual side effects with Wellbutrin  Stable mood  Looking forward to family trip to [facility] this summer  Continue all meds  He denies suicidal ideation, intent or plan at present, has no suicidal ideation, intent or plan at present  He denies any auditory hallucinations and visual hallucinations, denies any other delusional thinking, denies any delusional thinking  He denies any side effects from medications    HPI ROS Appetite Changes and Sleep: normal appetite, normal sleep    Review Of Systems:      Constitutional Negative   ENT Negative   Cardiovascular Negative   Respiratory As Noted in HPI   Gastrointestinal Negative   Genitourinary Negative   Musculoskeletal Negative   Integumentary Negative   Neurological Negative   Endocrine Negative   Other Symptoms Negative and None       Laboratory Results: No results found for this or any previous visit  Substance Abuse History:    History   Drug Use No       Family Psychiatric History:     Family History   Problem Relation Age of Onset    Alcohol abuse Father     Bipolar disorder Father        The following portions of the patient's history were reviewed and updated as appropriate: past family history, past medical history, past social history, past surgical history and problem list     Social History     Social History    Marital status: /Civil Union     Spouse name: N/A    Number of children: N/A    Years of education: N/A     Occupational History    Not on file       Social History Main Topics    Smoking status: Never Smoker    Smokeless tobacco: Never Used    Alcohol use No    Drug use: No    Sexual activity: Not on file     Other Topics Concern    Not on file     Social History Narrative    No narrative on file     Social History     Social History Narrative    No narrative on file        Social History     Tobacco History     Smoking Status  Never Smoker    Smokeless Tobacco Use  Never Used          Alcohol History     Alcohol Use Status  No          Drug Use     Drug Use Status  No          Sexual Activity     Sexually Active  Not Asked          Activities of Daily Living    Not Asked                     OBJECTIVE:     Mental Status Evaluation:    Appearance age appropriate, casually dressed   Behavior pleasant, cooperative   Speech normal volume, normal pitch   Mood Stable   Affect brighter   Thought Processes logical   Associations intact associations   Thought Content normal   Perceptual Disturbances: none   Abnormal Thoughts  Risk Potential Suicidal ideation - None  Homicidal ideation - None  Potential for aggression - Yes   Orientation oriented to person, place, time/date and situation   Memory recent and remote memory grossly intact   Cosciousness alert and awake   Attention Span attention span and concentration are age appropriate   Intellect Appears to be of Average Intelligence   Insight age appropriate and improved   Judgement good and improved   Muscle Strength and  Gait muscle strength and tone were normal   Language no difficulty naming common objects   Fund of Knowledge displays adequate knowledge of current events   Pain none   Pain Scale 0       Assessment/Plan:       Diagnoses and all orders for this visit:    Bipolar disorder with depression (HCC)  -     buPROPion (WELLBUTRIN SR) 100 mg 12 hr tablet; Take 1 tablet (100 mg total) by mouth daily  -     carBAMazepine (TEGretol) 200 mg tablet; Take 1 tablet (200 mg total) by mouth 2 (two) times a day  -     lithium carbonate 300 mg capsule; Take 1 capsule (300 mg total) by mouth 2 (two) times a day  -     LORazepam (ATIVAN) 0 5 mg tablet;  Take 1 tablet (0 5 mg total) by mouth 4 (four) times a day  -     sertraline (ZOLOFT) 100 mg tablet; Take 1 tablet (100 mg total) by mouth daily  -     traZODone (DESYREL) 100 mg tablet; Take 1 tablet (100 mg total) by mouth daily at bedtime  -     Lithium level; Future  -     Comprehensive metabolic panel; Future  -     Carbamazepine level, total; Future    ADD (attention deficit disorder) without hyperactivity    Attention deficit disorder (ADD) without hyperactivity  -     Discontinue: amphetamine-dextroamphetamine (ADDERALL XR) 30 MG 24 hr capsule; Take 1 capsule (30 mg total) by mouth daily Max Daily Amount: 30 mg  -     Discontinue: amphetamine-dextroamphetamine (ADDERALL XR) 30 MG 24 hr capsule; Take 1 capsule (30 mg total) by mouth daily Max Daily Amount: 30 mg  -     Discontinue: amphetamine-dextroamphetamine (ADDERALL XR) 30 MG 24 hr capsule; Take 1 capsule (30 mg total) by mouth daily Max Daily Amount: 30 mg  -     amphetamine-dextroamphetamine (ADDERALL XR) 30 MG 24 hr capsule; Take 1 capsule (30 mg total) by mouth daily Max Daily Amount: 30 mg          Treatment Recommendations/Precautions:    Continue current medications:    Risks/Benefits      Risks, Benefits And Possible Side Effects Of Medications:    Risks, benefits, and possible side effects of medications explained to patient and patient verbalizes understanding and agreement for treatment      Controlled Medication Discussion:     Not applicable    Psychotherapy Provided:     Individual psychotherapy provided: No

## 2018-06-28 ENCOUNTER — TELEPHONE (OUTPATIENT)
Dept: BEHAVIORAL/MENTAL HEALTH CLINIC | Facility: CLINIC | Age: 48
End: 2018-06-28

## 2018-06-28 DIAGNOSIS — F98.8 ATTENTION DEFICIT DISORDER (ADD) WITHOUT HYPERACTIVITY: ICD-10-CM

## 2018-06-28 NOTE — TELEPHONE ENCOUNTER
Pt's wife called stating that pt has new  Insurance, with this insurance he would need a prior authorization to get his ADDERALL XR 30 MG  But she did stat that  you can do a verbal over the phone ([phone]) stating he has been taking this med also makes it faster for him to be able to get his med

## 2018-10-19 DIAGNOSIS — F31.9 BIPOLAR DISORDER WITH DEPRESSION (HCC): ICD-10-CM

## 2018-10-19 RX ORDER — LORAZEPAM 0.5 MG/1
0.5 TABLET ORAL 4 TIMES DAILY
Qty: 120 TABLET | Refills: 2 | Status: SHIPPED | OUTPATIENT
Start: 2018-10-19 | End: 2018-10-31 | Stop reason: ALTCHOICE

## 2018-10-29 ENCOUNTER — APPOINTMENT (OUTPATIENT)
Dept: LAB | Facility: CLINIC | Age: 48
End: 2018-10-29
Payer: COMMERCIAL

## 2018-10-29 DIAGNOSIS — F31.9 BIPOLAR DISORDER WITH DEPRESSION (HCC): ICD-10-CM

## 2018-10-29 LAB
ALBUMIN SERPL BCP-MCNC: 3.7 G/DL (ref 3.5–5)
ALP SERPL-CCNC: 76 U/L (ref 46–116)
ALT SERPL W P-5'-P-CCNC: 30 U/L (ref 12–78)
ANION GAP SERPL CALCULATED.3IONS-SCNC: 10 MMOL/L (ref 4–13)
AST SERPL W P-5'-P-CCNC: 12 U/L (ref 5–45)
BILIRUB SERPL-MCNC: 0.3 MG/DL (ref 0.2–1)
BUN SERPL-MCNC: 15 MG/DL (ref 5–25)
CALCIUM SERPL-MCNC: 9 MG/DL (ref 8.3–10.1)
CARBAMAZEPINE SERPL-MCNC: 6.4 UG/ML (ref 4–12)
CHLORIDE SERPL-SCNC: 106 MMOL/L (ref 100–108)
CO2 SERPL-SCNC: 26 MMOL/L (ref 21–32)
CREAT SERPL-MCNC: 1.12 MG/DL (ref 0.6–1.3)
GFR SERPL CREATININE-BSD FRML MDRD: 78 ML/MIN/1.73SQ M
GLUCOSE SERPL-MCNC: 104 MG/DL (ref 65–140)
LITHIUM SERPL-SCNC: 0.4 MMOL/L (ref 0.5–1)
POTASSIUM SERPL-SCNC: 3.9 MMOL/L (ref 3.5–5.3)
PROT SERPL-MCNC: 7.1 G/DL (ref 6.4–8.2)
SODIUM SERPL-SCNC: 142 MMOL/L (ref 136–145)

## 2018-10-29 PROCEDURE — 80156 ASSAY CARBAMAZEPINE TOTAL: CPT

## 2018-10-29 PROCEDURE — 36415 COLL VENOUS BLD VENIPUNCTURE: CPT

## 2018-10-29 PROCEDURE — 80178 ASSAY OF LITHIUM: CPT

## 2018-10-29 PROCEDURE — 80053 COMPREHEN METABOLIC PANEL: CPT

## 2018-10-31 ENCOUNTER — OFFICE VISIT (OUTPATIENT)
Dept: PSYCHIATRY | Facility: CLINIC | Age: 48
End: 2018-10-31
Payer: COMMERCIAL

## 2018-10-31 DIAGNOSIS — F31.60 BIPOLAR 1 DISORDER, MIXED (HCC): Primary | ICD-10-CM

## 2018-10-31 DIAGNOSIS — F31.9 BIPOLAR DISORDER WITH DEPRESSION (HCC): ICD-10-CM

## 2018-10-31 DIAGNOSIS — F98.8 ATTENTION DEFICIT DISORDER (ADD) WITHOUT HYPERACTIVITY: ICD-10-CM

## 2018-10-31 PROCEDURE — 99214 OFFICE O/P EST MOD 30 MIN: CPT | Performed by: NURSE PRACTITIONER

## 2018-10-31 RX ORDER — DEXTROAMPHETAMINE SACCHARATE, AMPHETAMINE ASPARTATE MONOHYDRATE, DEXTROAMPHETAMINE SULFATE AND AMPHETAMINE SULFATE 7.5; 7.5; 7.5; 7.5 MG/1; MG/1; MG/1; MG/1
30 CAPSULE, EXTENDED RELEASE ORAL DAILY
Qty: 30 CAPSULE | Refills: 0 | Status: SHIPPED | OUTPATIENT
Start: 2018-10-31 | End: 2018-12-07 | Stop reason: SDUPTHER

## 2018-10-31 RX ORDER — LORAZEPAM 1 MG/1
TABLET ORAL
Qty: 45 TABLET | Refills: 2 | Status: SHIPPED | OUTPATIENT
Start: 2018-10-31 | End: 2018-11-08 | Stop reason: ALTCHOICE

## 2018-10-31 RX ORDER — LITHIUM CARBONATE 300 MG/1
300 CAPSULE ORAL 2 TIMES DAILY
Qty: 180 CAPSULE | Refills: 0 | Status: SHIPPED | OUTPATIENT
Start: 2018-10-31 | End: 2019-02-04 | Stop reason: SDUPTHER

## 2018-10-31 RX ORDER — TRAZODONE HYDROCHLORIDE 100 MG/1
100 TABLET ORAL
Qty: 90 TABLET | Refills: 0 | Status: SHIPPED | OUTPATIENT
Start: 2018-10-31 | End: 2019-02-04 | Stop reason: SDUPTHER

## 2018-10-31 RX ORDER — BUPROPION HYDROCHLORIDE 100 MG/1
100 TABLET, EXTENDED RELEASE ORAL DAILY
Qty: 90 TABLET | Refills: 0 | Status: SHIPPED | OUTPATIENT
Start: 2018-10-31 | End: 2019-02-04 | Stop reason: SDUPTHER

## 2018-10-31 RX ORDER — CARBAMAZEPINE 200 MG/1
200 TABLET ORAL 2 TIMES DAILY
Qty: 180 TABLET | Refills: 0 | Status: SHIPPED | OUTPATIENT
Start: 2018-10-31 | End: 2019-02-04 | Stop reason: SDUPTHER

## 2018-10-31 NOTE — PSYCH
PROGRESS NOTE        Geronimo Sanam      Name and Date of Birth:  Annabella Corral IV 57 y o  1970    Date of Visit: 10/31/18    SUBJECTIVE:    Richi Amezcua continues to feel better since the last visit  He has started a new position at [employer]  He is very pleased  He does have some episodes where he is upset with his wife who continues to have the position and is old company  He wishes she would move all out from that employer and forward to a new 1  All in good time  He is asking also we can start reducing some of the medication  I do agree  So today we are going to start with the small reduction of lorazepam and over the next 2 months we are going to titrate down and discontinue Zoloft  See him in 3 months or sooner if necessary  He denies suicidal ideation, intent or plan at present, has no suicidal ideation, intent or plan at present  He denies any auditory hallucinations and visual hallucinations, denies any other delusional thinking, denies any delusional thinking  He denies any side effects from medications    HPI ROS Appetite Changes and Sleep: normal appetite, normal sleep    Review Of Systems:      Constitutional Negative   ENT Negative   Cardiovascular Negative   Respiratory As Noted in HPI   Gastrointestinal Negative   Genitourinary Negative   Musculoskeletal Negative   Integumentary Negative   Neurological Negative   Endocrine Negative   Other Symptoms Negative and None       Laboratory Results: No results found for this or any previous visit      Substance Abuse History:    History   Drug Use No       Family Psychiatric History:     Family History   Problem Relation Age of Onset    Alcohol abuse Father     Bipolar disorder Father        The following portions of the patient's history were reviewed and updated as appropriate: past family history, past medical history, past social history, past surgical history and problem list     Social History     Social History    Marital status: /Civil Union     Spouse name: N/A    Number of children: N/A    Years of education: N/A     Occupational History    Not on file       Social History Main Topics    Smoking status: Never Smoker    Smokeless tobacco: Never Used    Alcohol use No    Drug use: No    Sexual activity: Not on file     Other Topics Concern    Not on file     Social History Narrative    No narrative on file     Social History     Social History Narrative    No narrative on file        Social History     Tobacco History     Smoking Status  Never Smoker    Smokeless Tobacco Use  Never Used          Alcohol History     Alcohol Use Status  No          Drug Use     Drug Use Status  No          Sexual Activity     Sexually Active  Not Asked          Activities of Daily Living    Not Asked                     OBJECTIVE:     Mental Status Evaluation:    Appearance age appropriate, casually dressed   Behavior pleasant, cooperative   Speech normal volume, normal pitch   Mood improved   Affect brighter   Thought Processes logical   Associations intact associations   Thought Content normal   Perceptual Disturbances: none   Abnormal Thoughts  Risk Potential Suicidal ideation - None  Homicidal ideation - None  Potential for aggression - No   Orientation oriented to person, place, time/date and situation   Memory recent and remote memory grossly intact   Cosciousness alert and awake   Attention Span attention span and concentration are age appropriate   Intellect Appears to be of Average Intelligence   Insight age appropriate and improved   Judgement good and improved   Muscle Strength and  Gait muscle strength and tone were normal   Language no difficulty naming common objects   Fund of Knowledge displays adequate knowledge of current events   Pain none   Pain Scale        Assessment/Plan:       Diagnoses and all orders for this visit:    Bipolar 1 disorder, mixed ([facility] [address] )  - LORazepam (ATIVAN) 1 mg tablet; May take up to 1 5 tabs per day  -     sertraline (ZOLOFT) 50 mg tablet; Take 1 daily for 1 month then reduce to 1/2 daily for 1 month and stop    Attention deficit disorder (ADD) without hyperactivity  -     amphetamine-dextroamphetamine (ADDERALL XR) 30 MG 24 hr capsule; Take 1 capsule (30 mg total) by mouth daily Max Daily Amount: 30 mg    Bipolar disorder with depression (HCC)  -     buPROPion (WELLBUTRIN SR) 100 mg 12 hr tablet; Take 1 tablet (100 mg total) by mouth daily  -     carBAMazepine (TEGretol) 200 mg tablet; Take 1 tablet (200 mg total) by mouth 2 (two) times a day  -     lithium carbonate 300 mg capsule; Take 1 capsule (300 mg total) by mouth 2 (two) times a day  -     traZODone (DESYREL) 100 mg tablet; Take 1 tablet (100 mg total) by mouth daily at bedtime          Treatment Recommendations/Precautions:    Continue current medications:    Risks/Benefits      Risks, Benefits And Possible Side Effects Of Medications:    Risks, benefits, and possible side effects of medications explained to patient and patient verbalizes understanding and agreement for treatment      Controlled Medication Discussion:     Takes all meds as prescribed    Psychotherapy Provided:     Individual psychotherapy provided: No

## 2018-11-08 ENCOUNTER — TELEPHONE (OUTPATIENT)
Dept: PSYCHIATRY | Facility: CLINIC | Age: 48
End: 2018-11-08

## 2018-11-08 DIAGNOSIS — F31.60 BIPOLAR 1 DISORDER, MIXED (HCC): Primary | ICD-10-CM

## 2018-11-08 RX ORDER — LORAZEPAM 1 MG/1
1 TABLET ORAL 2 TIMES DAILY
Qty: 60 TABLET | Refills: 2 | Status: SHIPPED | OUTPATIENT
Start: 2018-11-08 | End: 2019-02-04 | Stop reason: SDUPTHER

## 2018-11-08 RX ORDER — SERTRALINE HYDROCHLORIDE 100 MG/1
100 TABLET, FILM COATED ORAL DAILY
Qty: 90 TABLET | Refills: 2 | Status: SHIPPED | OUTPATIENT
Start: 2018-11-08 | End: 2019-02-04 | Stop reason: SDUPTHER

## 2018-12-06 ENCOUNTER — TELEPHONE (OUTPATIENT)
Dept: PSYCHIATRY | Facility: CLINIC | Age: 48
End: 2018-12-06

## 2018-12-07 DIAGNOSIS — F98.8 ADD (ATTENTION DEFICIT DISORDER): Primary | ICD-10-CM

## 2018-12-07 DIAGNOSIS — F98.8 ATTENTION DEFICIT DISORDER (ADD) WITHOUT HYPERACTIVITY: ICD-10-CM

## 2018-12-07 RX ORDER — DEXTROAMPHETAMINE SACCHARATE, AMPHETAMINE ASPARTATE MONOHYDRATE, DEXTROAMPHETAMINE SULFATE AND AMPHETAMINE SULFATE 7.5; 7.5; 7.5; 7.5 MG/1; MG/1; MG/1; MG/1
30 CAPSULE, EXTENDED RELEASE ORAL DAILY
Qty: 30 CAPSULE | Refills: 0 | Status: SHIPPED | OUTPATIENT
Start: 2018-12-07 | End: 2018-12-07 | Stop reason: SDUPTHER

## 2018-12-07 RX ORDER — DEXTROAMPHETAMINE SACCHARATE, AMPHETAMINE ASPARTATE MONOHYDRATE, DEXTROAMPHETAMINE SULFATE AND AMPHETAMINE SULFATE 7.5; 7.5; 7.5; 7.5 MG/1; MG/1; MG/1; MG/1
30 CAPSULE, EXTENDED RELEASE ORAL EVERY MORNING
Qty: 30 CAPSULE | Refills: 0 | Status: SHIPPED | OUTPATIENT
Start: 2018-12-07 | End: 2019-02-04 | Stop reason: SDUPTHER

## 2018-12-07 RX ORDER — DEXTROAMPHETAMINE SACCHARATE, AMPHETAMINE ASPARTATE MONOHYDRATE, DEXTROAMPHETAMINE SULFATE AND AMPHETAMINE SULFATE 7.5; 7.5; 7.5; 7.5 MG/1; MG/1; MG/1; MG/1
30 CAPSULE, EXTENDED RELEASE ORAL DAILY
Qty: 30 CAPSULE | Refills: 0 | Status: SHIPPED | OUTPATIENT
Start: 2018-12-07 | End: 2018-12-07 | Stop reason: ALTCHOICE

## 2019-02-04 ENCOUNTER — OFFICE VISIT (OUTPATIENT)
Dept: PSYCHIATRY | Facility: CLINIC | Age: 49
End: 2019-02-04
Payer: COMMERCIAL

## 2019-02-04 DIAGNOSIS — F31.60 BIPOLAR 1 DISORDER, MIXED (HCC): ICD-10-CM

## 2019-02-04 DIAGNOSIS — F90.0 ATTENTION DEFICIT HYPERACTIVITY DISORDER (ADHD), PREDOMINANTLY INATTENTIVE TYPE: ICD-10-CM

## 2019-02-04 DIAGNOSIS — F31.9 BIPOLAR DISORDER WITH DEPRESSION (HCC): ICD-10-CM

## 2019-02-04 PROCEDURE — 99213 OFFICE O/P EST LOW 20 MIN: CPT | Performed by: NURSE PRACTITIONER

## 2019-02-04 RX ORDER — DEXTROAMPHETAMINE SACCHARATE, AMPHETAMINE ASPARTATE MONOHYDRATE, DEXTROAMPHETAMINE SULFATE AND AMPHETAMINE SULFATE 7.5; 7.5; 7.5; 7.5 MG/1; MG/1; MG/1; MG/1
30 CAPSULE, EXTENDED RELEASE ORAL EVERY MORNING
Qty: 30 CAPSULE | Refills: 0 | Status: SHIPPED | OUTPATIENT
Start: 2019-02-04 | End: 2019-02-04 | Stop reason: SDUPTHER

## 2019-02-04 RX ORDER — DEXTROAMPHETAMINE SACCHARATE, AMPHETAMINE ASPARTATE MONOHYDRATE, DEXTROAMPHETAMINE SULFATE AND AMPHETAMINE SULFATE 7.5; 7.5; 7.5; 7.5 MG/1; MG/1; MG/1; MG/1
30 CAPSULE, EXTENDED RELEASE ORAL EVERY MORNING
Qty: 30 CAPSULE | Refills: 0 | Status: SHIPPED | OUTPATIENT
Start: 2019-03-04 | End: 2019-05-07 | Stop reason: SDUPTHER

## 2019-02-04 RX ORDER — CARBAMAZEPINE 200 MG/1
200 TABLET ORAL 2 TIMES DAILY
Qty: 60 TABLET | Refills: 2 | Status: SHIPPED | OUTPATIENT
Start: 2019-02-04 | End: 2019-05-07 | Stop reason: SDUPTHER

## 2019-02-04 RX ORDER — LORAZEPAM 1 MG/1
1 TABLET ORAL 2 TIMES DAILY
Qty: 60 TABLET | Refills: 2 | Status: SHIPPED | OUTPATIENT
Start: 2019-02-04 | End: 2019-05-07 | Stop reason: SDUPTHER

## 2019-02-04 RX ORDER — LITHIUM CARBONATE 300 MG/1
300 CAPSULE ORAL 2 TIMES DAILY
Qty: 60 CAPSULE | Refills: 2 | Status: SHIPPED | OUTPATIENT
Start: 2019-02-04 | End: 2019-05-07 | Stop reason: SDUPTHER

## 2019-02-04 RX ORDER — SERTRALINE HYDROCHLORIDE 100 MG/1
TABLET, FILM COATED ORAL
Qty: 30 TABLET | Refills: 2 | Status: SHIPPED | OUTPATIENT
Start: 2019-02-04 | End: 2019-05-07 | Stop reason: SDUPTHER

## 2019-02-04 RX ORDER — BUPROPION HYDROCHLORIDE 100 MG/1
100 TABLET, EXTENDED RELEASE ORAL DAILY
Qty: 30 TABLET | Refills: 2 | Status: SHIPPED | OUTPATIENT
Start: 2019-02-04 | End: 2019-05-07 | Stop reason: SDUPTHER

## 2019-02-04 RX ORDER — TRAZODONE HYDROCHLORIDE 100 MG/1
100 TABLET ORAL
Qty: 30 TABLET | Refills: 2 | Status: SHIPPED | OUTPATIENT
Start: 2019-02-04 | End: 2019-05-07 | Stop reason: SDUPTHER

## 2019-02-04 NOTE — PSYCH
PROGRESS NOTE        [address]      Name and Date of Birth:  Molly Marinelli IV 57 y o  1970    Date of Visit: 02/04/19    SUBJECTIVE:    Lucy Barba continues to feel better since the last visit  There are no new issues or concerns regarding his mood or his current medication regimen  He is taking all meds as prescribed with no issues  He has never tried the reduction of the Zoloft but I instructed him that if he would like to try breaking the pill in half and taking half a day we could start a med titration downward  He was see how he feels over the next several months and if he chooses to decrease the medication, he knows that that he could contact us if there are any issues  Otherwise, will follow-up in 3 months or sooner if necessary  He denies suicidal ideation, intent or plan at present, has no suicidal ideation, intent or plan at present  He denies any auditory hallucinations and visual hallucinations, denies any other delusional thinking, denies any delusional thinking  He denies any side effects from medications    HPI ROS Appetite Changes and Sleep: normal appetite, normal sleep    Review Of Systems:      Constitutional Negative   ENT Negative   Cardiovascular Negative   Respiratory As Noted in HPI   Gastrointestinal Negative   Genitourinary Negative   Musculoskeletal Negative   Integumentary Negative   Neurological Negative   Endocrine Negative   Other Symptoms Negative and None       Laboratory Results: No results found for this or any previous visit      Substance Abuse History:    History   Drug Use No       Family Psychiatric History:     Family History   Problem Relation Age of Onset    Alcohol abuse Father     Bipolar disorder Father        The following portions of the patient's history were reviewed and updated as appropriate: past family history, past medical history, past social history, past surgical history and problem list     Social History     Social History    Marital status: /Civil Union     Spouse name: N/A    Number of children: N/A    Years of education: N/A     Occupational History    Not on file       Social History Main Topics    Smoking status: Never Smoker    Smokeless tobacco: Never Used    Alcohol use No    Drug use: No    Sexual activity: Not on file     Other Topics Concern    Not on file     Social History Narrative    No narrative on file     Social History     Social History Narrative    No narrative on file        Social History     Tobacco History     Smoking Status  Never Smoker    Smokeless Tobacco Use  Never Used          Alcohol History     Alcohol Use Status  No          Drug Use     Drug Use Status  No          Sexual Activity     Sexually Active  Not Asked          Activities of Daily Living    Not Asked                     OBJECTIVE:     Mental Status Evaluation:    Appearance age appropriate, casually dressed   Behavior pleasant, cooperative   Speech normal volume, normal pitch   Mood improved   Affect brighter   Thought Processes logical   Associations intact associations   Thought Content normal   Perceptual Disturbances: none   Abnormal Thoughts  Risk Potential Suicidal ideation - None  Homicidal ideation - None  Potential for aggression - No   Orientation oriented to person, place, time/date and situation   Memory recent and remote memory grossly intact   Cosciousness alert and awake   Attention Span attention span and concentration are age appropriate   Intellect Appears to be of Average Intelligence   Insight age appropriate and improved   Judgement good and improved   Muscle Strength and  Gait muscle strength and tone were normal   Language no difficulty naming common objects   Fund of Knowledge displays adequate knowledge of current events   Pain none   Pain Scale 0       Assessment/Plan:       Diagnoses and all orders for this visit:    Bipolar disorder with depression ([facility] )  -     buPROPion (WELLBUTRIN SR) 100 mg 12 hr tablet; Take 1 tablet (100 mg total) by mouth daily  -     carBAMazepine (TEGretol) 200 mg tablet; Take 1 tablet (200 mg total) by mouth 2 (two) times a day  -     lithium carbonate 300 mg capsule; Take 1 capsule (300 mg total) by mouth 2 (two) times a day  -     traZODone (DESYREL) 100 mg tablet; Take 1 tablet (100 mg total) by mouth daily at bedtime    Bipolar 1 disorder, mixed (HCC)  -     LORazepam (ATIVAN) 1 mg tablet; Take 1 tablet (1 mg total) by mouth 2 (two) times a day  -     sertraline (ZOLOFT) 100 mg tablet; 1/2 or 1 tab daily    Attention deficit hyperactivity disorder (ADHD), predominantly inattentive type  -     Discontinue: amphetamine-dextroamphetamine (ADDERALL XR) 30 MG 24 hr capsule; Take 1 capsule (30 mg total) by mouth every morning Max Daily Amount: 30 mg  -     amphetamine-dextroamphetamine (ADDERALL XR) 30 MG 24 hr capsule; Take 1 capsule (30 mg total) by mouth every morning Max Daily Amount: 30 mg          Treatment Recommendations/Precautions:    Continue current medications: Wellbutrin  mg daily  Tegretol 200 mg b i d  Lithium carbonate 300 mg b i d   Trazodone 100 mg HS  Ativan 1 mg b i d   Zoloft 100 mg patient can take half for whole tablet daily if he chooses to start the titration off the medication  Follow-up in 3 months or sooner if necessary  Risks/Benefits      Risks, Benefits And Possible Side Effects Of Medications:    Risks, benefits, and possible side effects of medications explained to patient and patient verbalizes understanding and agreement for treatment      Controlled Medication Discussion:     No history of overuse/abuse    Psychotherapy Provided:     Individual psychotherapy provided: No

## 2019-05-07 ENCOUNTER — OFFICE VISIT (OUTPATIENT)
Dept: PSYCHIATRY | Facility: CLINIC | Age: 49
End: 2019-05-07
Payer: COMMERCIAL

## 2019-05-07 DIAGNOSIS — F31.60 BIPOLAR 1 DISORDER, MIXED (HCC): ICD-10-CM

## 2019-05-07 DIAGNOSIS — F90.0 ATTENTION DEFICIT HYPERACTIVITY DISORDER (ADHD), PREDOMINANTLY INATTENTIVE TYPE: ICD-10-CM

## 2019-05-07 DIAGNOSIS — F31.9 BIPOLAR DISORDER WITH DEPRESSION (HCC): ICD-10-CM

## 2019-05-07 PROCEDURE — 99213 OFFICE O/P EST LOW 20 MIN: CPT | Performed by: NURSE PRACTITIONER

## 2019-05-07 RX ORDER — BUPROPION HYDROCHLORIDE 100 MG/1
100 TABLET, EXTENDED RELEASE ORAL DAILY
Qty: 30 TABLET | Refills: 2 | Status: SHIPPED | OUTPATIENT
Start: 2019-05-07 | End: 2019-07-19 | Stop reason: SDUPTHER

## 2019-05-07 RX ORDER — TRAZODONE HYDROCHLORIDE 100 MG/1
100 TABLET ORAL
Qty: 30 TABLET | Refills: 2 | Status: SHIPPED | OUTPATIENT
Start: 2019-05-07 | End: 2019-07-19 | Stop reason: SDUPTHER

## 2019-05-07 RX ORDER — SERTRALINE HYDROCHLORIDE 100 MG/1
TABLET, FILM COATED ORAL
Qty: 30 TABLET | Refills: 2 | Status: SHIPPED | OUTPATIENT
Start: 2019-05-07 | End: 2019-07-19 | Stop reason: SDUPTHER

## 2019-05-07 RX ORDER — CARBAMAZEPINE 200 MG/1
200 TABLET ORAL 2 TIMES DAILY
Qty: 60 TABLET | Refills: 2 | Status: SHIPPED | OUTPATIENT
Start: 2019-05-07 | End: 2019-07-19 | Stop reason: SDUPTHER

## 2019-05-07 RX ORDER — LITHIUM CARBONATE 300 MG/1
300 CAPSULE ORAL 2 TIMES DAILY
Qty: 60 CAPSULE | Refills: 2 | Status: SHIPPED | OUTPATIENT
Start: 2019-05-07 | End: 2019-07-19 | Stop reason: SDUPTHER

## 2019-05-07 RX ORDER — DEXTROAMPHETAMINE SACCHARATE, AMPHETAMINE ASPARTATE MONOHYDRATE, DEXTROAMPHETAMINE SULFATE AND AMPHETAMINE SULFATE 7.5; 7.5; 7.5; 7.5 MG/1; MG/1; MG/1; MG/1
30 CAPSULE, EXTENDED RELEASE ORAL EVERY MORNING
Qty: 30 CAPSULE | Refills: 0 | Status: SHIPPED | OUTPATIENT
Start: 2019-05-07 | End: 2019-06-11 | Stop reason: SDUPTHER

## 2019-05-07 RX ORDER — LORAZEPAM 1 MG/1
1 TABLET ORAL 2 TIMES DAILY
Qty: 90 TABLET | Refills: 2 | Status: SHIPPED | OUTPATIENT
Start: 2019-05-07 | End: 2019-07-19 | Stop reason: SDUPTHER

## 2019-06-11 ENCOUNTER — TELEPHONE (OUTPATIENT)
Dept: PSYCHIATRY | Facility: CLINIC | Age: 49
End: 2019-06-11

## 2019-06-11 DIAGNOSIS — F90.0 ATTENTION DEFICIT HYPERACTIVITY DISORDER (ADHD), PREDOMINANTLY INATTENTIVE TYPE: ICD-10-CM

## 2019-06-11 RX ORDER — DEXTROAMPHETAMINE SACCHARATE, AMPHETAMINE ASPARTATE MONOHYDRATE, DEXTROAMPHETAMINE SULFATE AND AMPHETAMINE SULFATE 7.5; 7.5; 7.5; 7.5 MG/1; MG/1; MG/1; MG/1
30 CAPSULE, EXTENDED RELEASE ORAL EVERY MORNING
Qty: 30 CAPSULE | Refills: 0 | Status: SHIPPED | OUTPATIENT
Start: 2019-06-11 | End: 2019-07-09 | Stop reason: SDUPTHER

## 2019-07-09 ENCOUNTER — TELEPHONE (OUTPATIENT)
Dept: PSYCHIATRY | Facility: CLINIC | Age: 49
End: 2019-07-09

## 2019-07-09 DIAGNOSIS — F90.0 ATTENTION DEFICIT HYPERACTIVITY DISORDER (ADHD), PREDOMINANTLY INATTENTIVE TYPE: ICD-10-CM

## 2019-07-09 RX ORDER — DEXTROAMPHETAMINE SACCHARATE, AMPHETAMINE ASPARTATE MONOHYDRATE, DEXTROAMPHETAMINE SULFATE AND AMPHETAMINE SULFATE 7.5; 7.5; 7.5; 7.5 MG/1; MG/1; MG/1; MG/1
30 CAPSULE, EXTENDED RELEASE ORAL EVERY MORNING
Qty: 30 CAPSULE | Refills: 0 | Status: SHIPPED | OUTPATIENT
Start: 2019-07-09 | End: 2019-07-11 | Stop reason: SDUPTHER

## 2019-07-09 NOTE — TELEPHONE ENCOUNTER
Sera Alarcon called for refill on Adderall XR 30 mg        Upcoming appt 7/16/19      [facility] pharmacy# [phone]

## 2019-07-11 ENCOUNTER — TELEPHONE (OUTPATIENT)
Dept: PSYCHIATRY | Facility: CLINIC | Age: 49
End: 2019-07-11

## 2019-07-11 DIAGNOSIS — F90.0 ATTENTION DEFICIT HYPERACTIVITY DISORDER (ADHD), PREDOMINANTLY INATTENTIVE TYPE: ICD-10-CM

## 2019-07-11 RX ORDER — DEXTROAMPHETAMINE SACCHARATE, AMPHETAMINE ASPARTATE MONOHYDRATE, DEXTROAMPHETAMINE SULFATE AND AMPHETAMINE SULFATE 7.5; 7.5; 7.5; 7.5 MG/1; MG/1; MG/1; MG/1
30 CAPSULE, EXTENDED RELEASE ORAL EVERY MORNING
Qty: 30 CAPSULE | Refills: 0 | Status: SHIPPED | OUTPATIENT
Start: 2019-07-11 | End: 2019-07-19 | Stop reason: SDUPTHER

## 2019-07-11 NOTE — TELEPHONE ENCOUNTER
Geraldean Baron called asked if you can resend his medication for Adderall XR 30 mg 24 hr Capsule to [address] [place] [address], [facility]        [facility]# [phone]

## 2019-07-19 ENCOUNTER — OFFICE VISIT (OUTPATIENT)
Dept: PSYCHIATRY | Facility: CLINIC | Age: 49
End: 2019-07-19
Payer: COMMERCIAL

## 2019-07-19 DIAGNOSIS — F90.0 ATTENTION DEFICIT HYPERACTIVITY DISORDER (ADHD), PREDOMINANTLY INATTENTIVE TYPE: ICD-10-CM

## 2019-07-19 DIAGNOSIS — F31.9 BIPOLAR DISORDER WITH DEPRESSION (HCC): Primary | ICD-10-CM

## 2019-07-19 DIAGNOSIS — F41.1 GENERALIZED ANXIETY DISORDER: ICD-10-CM

## 2019-07-19 DIAGNOSIS — F31.60 BIPOLAR 1 DISORDER, MIXED (HCC): ICD-10-CM

## 2019-07-19 PROCEDURE — 99213 OFFICE O/P EST LOW 20 MIN: CPT | Performed by: NURSE PRACTITIONER

## 2019-07-19 RX ORDER — LORAZEPAM 1 MG/1
1 TABLET ORAL 2 TIMES DAILY
Qty: 90 TABLET | Refills: 2 | Status: SHIPPED | OUTPATIENT
Start: 2019-07-19 | End: 2019-12-05 | Stop reason: SDUPTHER

## 2019-07-19 RX ORDER — LITHIUM CARBONATE 300 MG/1
300 CAPSULE ORAL 2 TIMES DAILY
Qty: 60 CAPSULE | Refills: 5 | Status: SHIPPED | OUTPATIENT
Start: 2019-07-19 | End: 2019-11-25 | Stop reason: SDUPTHER

## 2019-07-19 RX ORDER — SERTRALINE HYDROCHLORIDE 100 MG/1
TABLET, FILM COATED ORAL
Qty: 30 TABLET | Refills: 5 | Status: SHIPPED | OUTPATIENT
Start: 2019-07-19 | End: 2019-11-06 | Stop reason: SDUPTHER

## 2019-07-19 RX ORDER — TRAZODONE HYDROCHLORIDE 100 MG/1
100 TABLET ORAL
Qty: 30 TABLET | Refills: 5 | Status: SHIPPED | OUTPATIENT
Start: 2019-07-19 | End: 2019-09-09 | Stop reason: SDUPTHER

## 2019-07-19 RX ORDER — DEXTROAMPHETAMINE SACCHARATE, AMPHETAMINE ASPARTATE MONOHYDRATE, DEXTROAMPHETAMINE SULFATE AND AMPHETAMINE SULFATE 7.5; 7.5; 7.5; 7.5 MG/1; MG/1; MG/1; MG/1
30 CAPSULE, EXTENDED RELEASE ORAL EVERY MORNING
Qty: 30 CAPSULE | Refills: 0 | Status: SHIPPED | OUTPATIENT
Start: 2019-07-19 | End: 2019-07-19 | Stop reason: SDUPTHER

## 2019-07-19 RX ORDER — CARBAMAZEPINE 200 MG/1
200 TABLET ORAL 2 TIMES DAILY
Qty: 60 TABLET | Refills: 5 | Status: SHIPPED | OUTPATIENT
Start: 2019-07-19 | End: 2019-11-25 | Stop reason: SDUPTHER

## 2019-07-19 RX ORDER — BUPROPION HYDROCHLORIDE 100 MG/1
100 TABLET, EXTENDED RELEASE ORAL DAILY
Qty: 30 TABLET | Refills: 5 | Status: SHIPPED | OUTPATIENT
Start: 2019-07-19 | End: 2019-12-05 | Stop reason: SDUPTHER

## 2019-07-19 RX ORDER — DEXTROAMPHETAMINE SACCHARATE, AMPHETAMINE ASPARTATE MONOHYDRATE, DEXTROAMPHETAMINE SULFATE AND AMPHETAMINE SULFATE 7.5; 7.5; 7.5; 7.5 MG/1; MG/1; MG/1; MG/1
30 CAPSULE, EXTENDED RELEASE ORAL EVERY MORNING
Qty: 30 CAPSULE | Refills: 0 | Status: SHIPPED | OUTPATIENT
Start: 2019-07-19 | End: 2019-08-07 | Stop reason: SDUPTHER

## 2019-07-19 NOTE — PSYCH
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  He is reporting no new clinical issues or concerns  He has been verbal, he is happy he is content and he is more socially interactive than he has ever been  Meds will continue as is and will follow up in 3 months       He denies suicidal ideation, intent or plan at present, has no suicidal ideation, intent or plan at present  He denies any auditory hallucinations and visual hallucinations, denies any other delusional thinking, denies any delusional thinking  He denies any side effects from medications    HPI ROS Appetite Changes and Sleep: normal appetite, normal sleep    Review Of Systems:      Constitutional Negative   ENT Negative   Cardiovascular Negative   Respiratory As Noted in HPI   Gastrointestinal Negative   Genitourinary Negative   Musculoskeletal Negative   Integumentary Negative   Neurological Negative   Endocrine Negative   Other Symptoms Negative and None       Laboratory Results: No results found for this or any previous visit      Substance Abuse History:    Social History     Substance and Sexual Activity   Drug Use No       Family Psychiatric History:     Family History   Problem Relation Age of Onset    Alcohol abuse Father     Bipolar disorder Father        The following portions of the patient's history were reviewed and updated as appropriate: past family history, past medical history, past social history, past surgical history and problem list     Social History     Socioeconomic History    Marital status: /Civil Union     Spouse name: Not on file    Number of children: Not on file    Years of education: Not on file    Highest education level: Not on file   Occupational History    Not on file   Social Needs    Financial resource strain: Not on file    Food insecurity:     Worry: Not on file     Inability: Not on file    Transportation needs:     Medical: Not on file     Non-medical: Not on file   Tobacco Use    Smoking status: Never Smoker    Smokeless tobacco: Never Used   Substance and Sexual Activity    Alcohol use: No    Drug use: No    Sexual activity: Not on file   Lifestyle    Physical activity:     Days per week: Not on file     Minutes per session: Not on file    Stress: Not on file   Relationships    Social connections:     Talks on phone: Not on file     Gets together: Not on file     Attends Yazdanism service: Not on file     Active member of club or organization: Not on file     Attends meetings of clubs or organizations: Not on file     Relationship status: Not on file    Intimate partner violence:     Fear of current or ex partner: Not on file     Emotionally abused: Not on file     Physically abused: Not on file     Forced sexual activity: Not on file   Other Topics Concern    Not on file   Social History Narrative    Not on file     Social History     Social History Narrative    Not on file        Social History     Tobacco History     Smoking Status  Never Smoker    Smokeless Tobacco Use  Never Used          Alcohol History     Alcohol Use Status  No          Drug Use     Drug Use Status  No          Sexual Activity     Sexually Active  Not Asked          Activities of Daily Living    Not Asked                     OBJECTIVE:     Mental Status Evaluation:    Appearance age appropriate, casually dressed   Behavior pleasant, cooperative   Speech normal volume, normal pitch   Mood Stable Affect brighter   Thought Processes logical   Associations intact associations   Thought Content Normal   Perceptual Disturbances: None   Abnormal Thoughts  Risk Potential Suicidal ideation - None  Homicidal ideation - None  Potential for aggression - No   Orientation oriented to person, place, time/date and situation   Memory recent and remote memory grossly intact   Cosciousness alert and awake   Attention Span attention span and concentration are age appropriate   Intellect Appears to be of Average Intelligence   Insight age appropriate and improved   Judgement good and improved   Muscle Strength and  Gait muscle strength and tone were normal   Language no difficulty naming common objects   Fund of Knowledge displays adequate knowledge of current events   Pain None   Pain Scale 0       Assessment/Plan:       Diagnoses and all orders for this visit:    Bipolar disorder with depression (HCC)  -     buPROPion (WELLBUTRIN SR) 100 mg 12 hr tablet; Take 1 tablet (100 mg total) by mouth daily  -     carBAMazepine (TEGretol) 200 mg tablet; Take 1 tablet (200 mg total) by mouth 2 (two) times a day  -     lithium carbonate 300 mg capsule; Take 1 capsule (300 mg total) by mouth 2 (two) times a day  -     traZODone (DESYREL) 100 mg tablet; Take 1 tablet (100 mg total) by mouth daily at bedtime    Attention deficit hyperactivity disorder (ADHD), predominantly inattentive type  -     Discontinue: amphetamine-dextroamphetamine (ADDERALL XR) 30 MG 24 hr capsule; Take 1 capsule (30 mg total) by mouth every morningMax Daily Amount: 30 mg  -     Discontinue: amphetamine-dextroamphetamine (ADDERALL XR) 30 MG 24 hr capsule; Take 1 capsule (30 mg total) by mouth every morningMax Daily Amount: 30 mg  -     Discontinue: amphetamine-dextroamphetamine (ADDERALL XR) 30 MG 24 hr capsule; Take 1 capsule (30 mg total) by mouth every morningMax Daily Amount: 30 mg  -     amphetamine-dextroamphetamine (ADDERALL XR) 30 MG 24 hr capsule;  Take 1 capsule (30 mg total) by mouth every morningMax Daily Amount: 30 mg    Generalized anxiety disorder    Bipolar 1 disorder, mixed (HCC)  -     LORazepam (ATIVAN) 1 mg tablet; Take 1 tablet (1 mg total) by mouth 2 (two) times a day And 1 extra daily PRN severe anxiety  -     sertraline (ZOLOFT) 100 mg tablet; 1 Daily          Treatment Recommendations/Precautions:    Continue current medications: Adderall XR 30 mg daily  Wellbutrin  mg daily  Tegretol 200 mg b i d  Lithium carbonate 300 mg b i d  Ativan 1 mg b i d   Zoloft 100 mg daily  Trazodone 100 mg HS  Follow-up in 3-4 months or sooner if necessary  Risks/Benefits      Risks, Benefits And Possible Side Effects Of Medications:    Risks, benefits, and possible side effects of medications explained to patient and patient verbalizes understanding and agreement for treatment  Controlled Medication Discussion:  Takes meds as prescribed there is no history of any overuse or abuse      Not applicable    Psychotherapy Provided:     Individual psychotherapy provided: No

## 2019-08-07 ENCOUNTER — TELEPHONE (OUTPATIENT)
Dept: PSYCHIATRY | Facility: CLINIC | Age: 49
End: 2019-08-07

## 2019-08-07 DIAGNOSIS — F90.0 ATTENTION DEFICIT HYPERACTIVITY DISORDER (ADHD), PREDOMINANTLY INATTENTIVE TYPE: ICD-10-CM

## 2019-08-07 RX ORDER — DEXTROAMPHETAMINE SACCHARATE, AMPHETAMINE ASPARTATE MONOHYDRATE, DEXTROAMPHETAMINE SULFATE AND AMPHETAMINE SULFATE 7.5; 7.5; 7.5; 7.5 MG/1; MG/1; MG/1; MG/1
30 CAPSULE, EXTENDED RELEASE ORAL EVERY MORNING
Qty: 30 CAPSULE | Refills: 0 | Status: SHIPPED | OUTPATIENT
Start: 2019-08-07 | End: 2019-09-09 | Stop reason: SDUPTHER

## 2019-08-07 RX ORDER — DEXTROAMPHETAMINE SACCHARATE, AMPHETAMINE ASPARTATE MONOHYDRATE, DEXTROAMPHETAMINE SULFATE AND AMPHETAMINE SULFATE 7.5; 7.5; 7.5; 7.5 MG/1; MG/1; MG/1; MG/1
30 CAPSULE, EXTENDED RELEASE ORAL EVERY MORNING
Qty: 30 CAPSULE | Refills: 0 | Status: SHIPPED | OUTPATIENT
Start: 2019-08-07 | End: 2019-08-07 | Stop reason: SDUPTHER

## 2019-08-07 NOTE — TELEPHONE ENCOUNTER
Lilibeth Laughter called said Poli had to change pharmacy due to his job insurance and ask if you can send a prescription for Adderall XR 30 mg 24hr capsule to his new pharmacy all his other medications where able to be transferred except the Adderall       [facility]# [phone]

## 2019-09-09 DIAGNOSIS — F31.9 BIPOLAR DISORDER WITH DEPRESSION (HCC): ICD-10-CM

## 2019-09-09 DIAGNOSIS — F90.0 ATTENTION DEFICIT HYPERACTIVITY DISORDER (ADHD), PREDOMINANTLY INATTENTIVE TYPE: ICD-10-CM

## 2019-09-09 RX ORDER — DEXTROAMPHETAMINE SACCHARATE, AMPHETAMINE ASPARTATE MONOHYDRATE, DEXTROAMPHETAMINE SULFATE AND AMPHETAMINE SULFATE 7.5; 7.5; 7.5; 7.5 MG/1; MG/1; MG/1; MG/1
30 CAPSULE, EXTENDED RELEASE ORAL EVERY MORNING
Qty: 30 CAPSULE | Refills: 0 | Status: SHIPPED | OUTPATIENT
Start: 2019-09-09 | End: 2019-09-10 | Stop reason: SDUPTHER

## 2019-09-09 RX ORDER — TRAZODONE HYDROCHLORIDE 100 MG/1
100 TABLET ORAL
Qty: 30 TABLET | Refills: 5 | OUTPATIENT
Start: 2019-09-09

## 2019-09-09 RX ORDER — DEXTROAMPHETAMINE SACCHARATE, AMPHETAMINE ASPARTATE MONOHYDRATE, DEXTROAMPHETAMINE SULFATE AND AMPHETAMINE SULFATE 7.5; 7.5; 7.5; 7.5 MG/1; MG/1; MG/1; MG/1
30 CAPSULE, EXTENDED RELEASE ORAL EVERY MORNING
Qty: 30 CAPSULE | Refills: 0 | OUTPATIENT
Start: 2019-09-09

## 2019-09-09 RX ORDER — TRAZODONE HYDROCHLORIDE 100 MG/1
100 TABLET ORAL
Qty: 30 TABLET | Refills: 5 | Status: SHIPPED | OUTPATIENT
Start: 2019-09-09 | End: 2019-09-10 | Stop reason: SDUPTHER

## 2019-09-09 NOTE — TELEPHONE ENCOUNTER
Pt's wife called this morning to request refills for her , trazadone 2 refills to last until November  , and adderall for september  Pharmacy closes at [address] today  Miguelina Werner is off today, and Thanh Saldana is out of meds

## 2019-09-10 DIAGNOSIS — F90.0 ATTENTION DEFICIT HYPERACTIVITY DISORDER (ADHD), PREDOMINANTLY INATTENTIVE TYPE: ICD-10-CM

## 2019-09-10 DIAGNOSIS — F31.9 BIPOLAR DISORDER WITH DEPRESSION (HCC): ICD-10-CM

## 2019-09-10 RX ORDER — DEXTROAMPHETAMINE SACCHARATE, AMPHETAMINE ASPARTATE MONOHYDRATE, DEXTROAMPHETAMINE SULFATE AND AMPHETAMINE SULFATE 7.5; 7.5; 7.5; 7.5 MG/1; MG/1; MG/1; MG/1
30 CAPSULE, EXTENDED RELEASE ORAL EVERY MORNING
Qty: 30 CAPSULE | Refills: 0 | Status: SHIPPED | OUTPATIENT
Start: 2019-09-10 | End: 2019-11-06 | Stop reason: SDUPTHER

## 2019-09-10 RX ORDER — TRAZODONE HYDROCHLORIDE 100 MG/1
100 TABLET ORAL
Qty: 30 TABLET | Refills: 5 | Status: SHIPPED | OUTPATIENT
Start: 2019-09-10 | End: 2019-12-05 | Stop reason: SDUPTHER

## 2019-11-06 ENCOUNTER — TELEPHONE (OUTPATIENT)
Dept: PSYCHIATRY | Facility: CLINIC | Age: 49
End: 2019-11-06

## 2019-11-06 DIAGNOSIS — F90.0 ATTENTION DEFICIT HYPERACTIVITY DISORDER (ADHD), PREDOMINANTLY INATTENTIVE TYPE: ICD-10-CM

## 2019-11-06 DIAGNOSIS — F31.60 BIPOLAR 1 DISORDER, MIXED (HCC): ICD-10-CM

## 2019-11-06 RX ORDER — DEXTROAMPHETAMINE SACCHARATE, AMPHETAMINE ASPARTATE MONOHYDRATE, DEXTROAMPHETAMINE SULFATE AND AMPHETAMINE SULFATE 7.5; 7.5; 7.5; 7.5 MG/1; MG/1; MG/1; MG/1
30 CAPSULE, EXTENDED RELEASE ORAL EVERY MORNING
Qty: 30 CAPSULE | Refills: 0 | Status: SHIPPED | OUTPATIENT
Start: 2019-11-06 | End: 2019-12-05 | Stop reason: SDUPTHER

## 2019-11-06 RX ORDER — SERTRALINE HYDROCHLORIDE 100 MG/1
TABLET, FILM COATED ORAL
Qty: 30 TABLET | Refills: 5 | Status: SHIPPED | OUTPATIENT
Start: 2019-11-06 | End: 2019-12-05 | Stop reason: SDUPTHER

## 2019-11-06 NOTE — TELEPHONE ENCOUNTER
Amanda Donnelly called for refill on     Adderall XR 30mg 24hr capsule  Sertraline 100 mg tablet    [facility]# [phone]

## 2019-11-25 DIAGNOSIS — F31.9 BIPOLAR DISORDER WITH DEPRESSION (HCC): ICD-10-CM

## 2019-11-26 RX ORDER — CARBAMAZEPINE 200 MG/1
200 TABLET ORAL 2 TIMES DAILY
Qty: 60 TABLET | Refills: 5 | Status: SHIPPED | OUTPATIENT
Start: 2019-11-26 | End: 2019-12-05 | Stop reason: SDUPTHER

## 2019-11-26 RX ORDER — LITHIUM CARBONATE 300 MG/1
300 CAPSULE ORAL 2 TIMES DAILY
Qty: 60 CAPSULE | Refills: 5 | Status: SHIPPED | OUTPATIENT
Start: 2019-11-26 | End: 2020-02-04 | Stop reason: SDUPTHER

## 2019-12-05 ENCOUNTER — OFFICE VISIT (OUTPATIENT)
Dept: PSYCHIATRY | Facility: CLINIC | Age: 49
End: 2019-12-05

## 2019-12-05 DIAGNOSIS — F31.9 BIPOLAR DISORDER WITH DEPRESSION (HCC): Primary | ICD-10-CM

## 2019-12-05 DIAGNOSIS — F90.0 ATTENTION DEFICIT HYPERACTIVITY DISORDER (ADHD), PREDOMINANTLY INATTENTIVE TYPE: ICD-10-CM

## 2019-12-05 DIAGNOSIS — F31.60 BIPOLAR 1 DISORDER, MIXED (HCC): ICD-10-CM

## 2019-12-05 DIAGNOSIS — F41.1 GENERALIZED ANXIETY DISORDER: ICD-10-CM

## 2019-12-05 PROCEDURE — 99214 OFFICE O/P EST MOD 30 MIN: CPT | Performed by: NURSE PRACTITIONER

## 2019-12-05 RX ORDER — BUPROPION HYDROCHLORIDE 100 MG/1
TABLET, EXTENDED RELEASE ORAL
Qty: 60 TABLET | Refills: 5 | Status: SHIPPED | OUTPATIENT
Start: 2019-12-05 | End: 2020-02-04 | Stop reason: SDUPTHER

## 2019-12-05 RX ORDER — CARBAMAZEPINE 200 MG/1
200 TABLET ORAL 2 TIMES DAILY
Qty: 60 TABLET | Refills: 5 | Status: SHIPPED | OUTPATIENT
Start: 2019-12-05 | End: 2020-02-04 | Stop reason: SDUPTHER

## 2019-12-05 RX ORDER — TRAZODONE HYDROCHLORIDE 100 MG/1
100 TABLET ORAL
Qty: 30 TABLET | Refills: 5 | Status: SHIPPED | OUTPATIENT
Start: 2019-12-05 | End: 2020-02-04 | Stop reason: SDUPTHER

## 2019-12-05 RX ORDER — DEXTROAMPHETAMINE SACCHARATE, AMPHETAMINE ASPARTATE MONOHYDRATE, DEXTROAMPHETAMINE SULFATE AND AMPHETAMINE SULFATE 7.5; 7.5; 7.5; 7.5 MG/1; MG/1; MG/1; MG/1
30 CAPSULE, EXTENDED RELEASE ORAL EVERY MORNING
Qty: 30 CAPSULE | Refills: 0 | Status: SHIPPED | OUTPATIENT
Start: 2019-12-05 | End: 2020-01-08 | Stop reason: SDUPTHER

## 2019-12-05 RX ORDER — LORAZEPAM 1 MG/1
1 TABLET ORAL 2 TIMES DAILY
Qty: 90 TABLET | Refills: 2 | Status: SHIPPED | OUTPATIENT
Start: 2019-12-05 | End: 2020-02-04 | Stop reason: SDUPTHER

## 2019-12-05 NOTE — PSYCH
PROGRESS NOTE        [facility]      Name and Date of Birth:  Matthew Rasheed IV 54 y o  1970    Date of Visit: 12/05/19    SUBJECTIVE:    Thanh Saldana presents today for treatment of bipolar depression, mood disorder, generalized anxiety disorder  Comes in somewhat sullen today  He has been having some issues at home mostly related to marriage  And his wife have moments with her not getting along as well as they did  His wife is suffering from some menopausal issues and she is more apt to argue than discussed  Also, she is staying close to the home and not wanting to go out in do things that might be fun as she may have done the past   Also, he is suffering some some erectile dysfunction probably secondary to medication  He will be discussing this with his PCP but we will also make some medication changes  Today we will increase Wellbutrin to 200 mg a day and decrease Zoloft to 50 mg a day  Our intent maybe eventually getting him off the Zoloft and is keeping on the Wellbutrin and the other medications  We will check labs also  He is having no suicidal ideation   I have also encouraged him to plan a day on the weekend and surprise his wife with the day trip  He is willing to try that  We will see him again in 6 weeks  He denies suicidal ideation, intent or plan at present, has no suicidal ideation, intent or plan at present  He denies any auditory hallucinations and visual hallucinations, denies any other delusional thinking, denies any delusional thinking  He denies any side effects from medications      HPI ROS Appetite Changes and Sleep: normal appetite, normal sleep    Review Of Systems:      Constitutional Negative   ENT Negative   Cardiovascular Negative   Respiratory As Noted in HPI   Gastrointestinal Negative   Genitourinary Negative   Musculoskeletal Negative   Integumentary Negative   Neurological Negative   Endocrine Negative Other Symptoms Negative and None       Laboratory Results: No results found for this or any previous visit      Substance Abuse History:    Social History     Substance and Sexual Activity   Drug Use No       Family Psychiatric History:     Family History   Problem Relation Age of Onset    Alcohol abuse Father     Bipolar disorder Father        The following portions of the patient's history were reviewed and updated as appropriate: past family history, past medical history, past social history, past surgical history and problem list     Social History     Socioeconomic History    Marital status: /Civil Union     Spouse name: Not on file    Number of children: Not on file    Years of education: Not on file    Highest education level: Not on file   Occupational History    Not on file   Social Needs    Financial resource strain: Not on file    Food insecurity:     Worry: Not on file     Inability: Not on file    Transportation needs:     Medical: Not on file     Non-medical: Not on file   Tobacco Use    Smoking status: Never Smoker    Smokeless tobacco: Never Used   Substance and Sexual Activity    Alcohol use: No    Drug use: No    Sexual activity: Not on file   Lifestyle    Physical activity:     Days per week: Not on file     Minutes per session: Not on file    Stress: Not on file   Relationships    Social connections:     Talks on phone: Not on file     Gets together: Not on file     Attends Yazdanism service: Not on file     Active member of club or organization: Not on file     Attends meetings of clubs or organizations: Not on file     Relationship status: Not on file    Intimate partner violence:     Fear of current or ex partner: Not on file     Emotionally abused: Not on file     Physically abused: Not on file     Forced sexual activity: Not on file   Other Topics Concern    Not on file   Social History Narrative    Not on file     Social History     Social History Narrative    Not on file        Social History     Tobacco History     Smoking Status  Never Smoker    Smokeless Tobacco Use  Never Used          Alcohol History     Alcohol Use Status  No          Drug Use     Drug Use Status  No          Sexual Activity     Sexually Active  Not Asked          Activities of Daily Living    Not Asked                     OBJECTIVE:     Mental Status Evaluation:    Appearance age appropriate, casually dressed   Behavior pleasant, cooperative   Speech normal volume, normal pitch   Mood Mild depression   Affect Dull   Thought Processes logical   Associations intact associations   Thought Content Normal   Perceptual Disturbances: None   Abnormal Thoughts  Risk Potential Suicidal ideation - None  Homicidal ideation - None  Potential for aggression - No   Orientation oriented to person, place, time/date and situation   Memory recent and remote memory grossly intact   Cosciousness alert and awake   Attention Span attention span and concentration are age appropriate   Intellect Appears to be of Average Intelligence   Insight Fair   Judgement Fair   Muscle Strength and  Gait muscle strength and tone were normal   Language no difficulty naming common objects   Fund of Knowledge displays adequate knowledge of current events   Pain None   Pain Scale 0       Assessment/Plan:       Diagnoses and all orders for this visit:    B[facility] with [facility] ([facility] )  -     buPROPion (WELLBUTRIN SR) 100 mg 12 hr tablet; 1 in AM and 1 at 1PM  -     carBAMazepine (TEGretol) 200 mg tablet; Take 1 tablet (200 mg total) by mouth 2 (two) times a day  -     traZODone (DESYREL) 100 mg tablet; Take 1 tablet (100 mg total) by mouth daily at bedtime  -     CBC and differential; Future  -     Comprehensive metabolic panel; Future  -     TSH + Free T4; Future  -     Carbamazepine level, total; Future  -     Lithium level;  Future    Generalized anxiety disorder    Bipolar 1 disorder, mixed ([facility])  -     sertraline (ZOLOFT) 50 mg tablet; 1 Daily  -     LORazepam (ATIVAN) 1 mg tablet; Take 1 tablet (1 mg total) by mouth 2 (two) times a day And 1 extra daily PRN severe anxiety    Attention deficit hyperactivity disorder (ADHD), predominantly inattentive type  -     amphetamine-dextroamphetamine (ADDERALL XR) 30 MG 24 hr capsule; Take 1 capsule (30 mg total) by mouth every morningMax Daily Amount: 30 mg          Treatment Recommendations/Precautions:    Continue current medications:  Check labs  Reduce Zoloft 50 mg daily  Increase Wellbutrin  mg b i d  Continue Adderall XR 30 mg daily  Continue Tegretol 200 mg b i d  Continue lithium carbonate 300 mg b i d  Continue trazodone 100 mg HS  Continue Ativan 1 mg b i d  And may have 1 extra p r n  Daily for severe anxiety  Follow-up in 6 weeks  Risks/Benefits      Risks, Benefits And Possible Side Effects Of Medications:    Risks, benefits, and possible side effects of medications explained to patient and patient verbalizes understanding and agreement for treatment  Controlled Medication Discussion:  No history of any overuse or abuse of controlled substances      Not applicable    Psychotherapy Provided:     Individual psychotherapy provided: No

## 2020-01-08 DIAGNOSIS — F90.0 ATTENTION DEFICIT HYPERACTIVITY DISORDER (ADHD), PREDOMINANTLY INATTENTIVE TYPE: ICD-10-CM

## 2020-01-08 RX ORDER — DEXTROAMPHETAMINE SACCHARATE, AMPHETAMINE ASPARTATE MONOHYDRATE, DEXTROAMPHETAMINE SULFATE AND AMPHETAMINE SULFATE 7.5; 7.5; 7.5; 7.5 MG/1; MG/1; MG/1; MG/1
30 CAPSULE, EXTENDED RELEASE ORAL EVERY MORNING
Qty: 30 CAPSULE | Refills: 0 | Status: SHIPPED | OUTPATIENT
Start: 2020-01-08 | End: 2020-02-04 | Stop reason: SDUPTHER

## 2020-02-04 ENCOUNTER — OFFICE VISIT (OUTPATIENT)
Dept: PSYCHIATRY | Facility: CLINIC | Age: 50
End: 2020-02-04
Payer: COMMERCIAL

## 2020-02-04 DIAGNOSIS — F31.60 BIPOLAR 1 DISORDER, MIXED (HCC): ICD-10-CM

## 2020-02-04 DIAGNOSIS — F90.0 ATTENTION DEFICIT HYPERACTIVITY DISORDER (ADHD), PREDOMINANTLY INATTENTIVE TYPE: ICD-10-CM

## 2020-02-04 DIAGNOSIS — F31.9 BIPOLAR DISORDER WITH DEPRESSION (HCC): Primary | ICD-10-CM

## 2020-02-04 PROCEDURE — 99214 OFFICE O/P EST MOD 30 MIN: CPT | Performed by: NURSE PRACTITIONER

## 2020-02-04 RX ORDER — LORAZEPAM 1 MG/1
1 TABLET ORAL 2 TIMES DAILY
Qty: 90 TABLET | Refills: 2 | Status: SHIPPED | OUTPATIENT
Start: 2020-02-04 | End: 2020-05-07 | Stop reason: SDUPTHER

## 2020-02-04 RX ORDER — DEXTROAMPHETAMINE SACCHARATE, AMPHETAMINE ASPARTATE MONOHYDRATE, DEXTROAMPHETAMINE SULFATE AND AMPHETAMINE SULFATE 7.5; 7.5; 7.5; 7.5 MG/1; MG/1; MG/1; MG/1
30 CAPSULE, EXTENDED RELEASE ORAL EVERY MORNING
Qty: 30 CAPSULE | Refills: 0 | Status: SHIPPED | OUTPATIENT
Start: 2020-02-04 | End: 2020-03-09 | Stop reason: SDUPTHER

## 2020-02-04 RX ORDER — CARBAMAZEPINE 200 MG/1
200 TABLET ORAL 2 TIMES DAILY
Qty: 60 TABLET | Refills: 5 | Status: SHIPPED | OUTPATIENT
Start: 2020-02-04 | End: 2020-05-07 | Stop reason: SDUPTHER

## 2020-02-04 RX ORDER — BUPROPION HYDROCHLORIDE 100 MG/1
TABLET, EXTENDED RELEASE ORAL
Qty: 60 TABLET | Refills: 5 | Status: SHIPPED | OUTPATIENT
Start: 2020-02-04 | End: 2020-05-07 | Stop reason: SDUPTHER

## 2020-02-04 RX ORDER — TRAZODONE HYDROCHLORIDE 100 MG/1
100 TABLET ORAL
Qty: 30 TABLET | Refills: 5 | Status: SHIPPED | OUTPATIENT
Start: 2020-02-04 | End: 2020-05-07 | Stop reason: SDUPTHER

## 2020-02-04 RX ORDER — LITHIUM CARBONATE 300 MG/1
300 CAPSULE ORAL 2 TIMES DAILY
Qty: 60 CAPSULE | Refills: 5 | Status: SHIPPED | OUTPATIENT
Start: 2020-02-04 | End: 2020-05-07 | Stop reason: SDUPTHER

## 2020-02-20 ENCOUNTER — DOCUMENTATION (OUTPATIENT)
Dept: PSYCHIATRY | Facility: CLINIC | Age: 50
End: 2020-02-20

## 2020-02-20 NOTE — PROGRESS NOTES
Treatment Plan Tracking    Treatment Plan not completed within required time limits due to: Client did not schedule an appointment within 120 days from initial treatment plan on 3/23/2018   Next treatment plan was completed until 10/31/2018

## 2020-03-09 DIAGNOSIS — F90.0 ATTENTION DEFICIT HYPERACTIVITY DISORDER (ADHD), PREDOMINANTLY INATTENTIVE TYPE: ICD-10-CM

## 2020-03-10 RX ORDER — DEXTROAMPHETAMINE SACCHARATE, AMPHETAMINE ASPARTATE MONOHYDRATE, DEXTROAMPHETAMINE SULFATE AND AMPHETAMINE SULFATE 7.5; 7.5; 7.5; 7.5 MG/1; MG/1; MG/1; MG/1
30 CAPSULE, EXTENDED RELEASE ORAL EVERY MORNING
Qty: 30 CAPSULE | Refills: 0 | Status: SHIPPED | OUTPATIENT
Start: 2020-03-10 | End: 2020-03-25 | Stop reason: SDUPTHER

## 2020-03-25 ENCOUNTER — TELEPHONE (OUTPATIENT)
Dept: PSYCHIATRY | Facility: CLINIC | Age: 50
End: 2020-03-25

## 2020-03-25 DIAGNOSIS — F90.0 ATTENTION DEFICIT HYPERACTIVITY DISORDER (ADHD), PREDOMINANTLY INATTENTIVE TYPE: ICD-10-CM

## 2020-03-25 RX ORDER — DEXTROAMPHETAMINE SACCHARATE, AMPHETAMINE ASPARTATE MONOHYDRATE, DEXTROAMPHETAMINE SULFATE AND AMPHETAMINE SULFATE 7.5; 7.5; 7.5; 7.5 MG/1; MG/1; MG/1; MG/1
30 CAPSULE, EXTENDED RELEASE ORAL EVERY MORNING
Qty: 30 CAPSULE | Refills: 0 | Status: SHIPPED | OUTPATIENT
Start: 2020-03-25 | End: 2020-04-06

## 2020-03-25 NOTE — TELEPHONE ENCOUNTER
Mor Ptaton called asked if you can send a prescription for Adderall XR 30mg 24hr capsule  to [facility], she knows its early to fill  Irving Patel is going to start using their delivery  The pharmacy will hold the prescription until its time to fill  Mor Patton would like a call back if this can not be done            Contact# [phone]

## 2020-04-06 ENCOUNTER — TELEPHONE (OUTPATIENT)
Dept: PSYCHIATRY | Facility: CLINIC | Age: 50
End: 2020-04-06

## 2020-04-06 DIAGNOSIS — F90.0 ATTENTION DEFICIT HYPERACTIVITY DISORDER (ADHD), PREDOMINANTLY INATTENTIVE TYPE: ICD-10-CM

## 2020-04-06 RX ORDER — DEXTROAMPHETAMINE SACCHARATE, AMPHETAMINE ASPARTATE MONOHYDRATE, DEXTROAMPHETAMINE SULFATE AND AMPHETAMINE SULFATE 5; 5; 5; 5 MG/1; MG/1; MG/1; MG/1
20 CAPSULE, EXTENDED RELEASE ORAL EVERY MORNING
Qty: 30 CAPSULE | Refills: 0 | Status: SHIPPED | OUTPATIENT
Start: 2020-04-06 | End: 2020-05-07 | Stop reason: SDUPTHER

## 2020-04-06 RX ORDER — DEXTROAMPHETAMINE SACCHARATE, AMPHETAMINE ASPARTATE MONOHYDRATE, DEXTROAMPHETAMINE SULFATE AND AMPHETAMINE SULFATE 2.5; 2.5; 2.5; 2.5 MG/1; MG/1; MG/1; MG/1
10 CAPSULE, EXTENDED RELEASE ORAL EVERY MORNING
Qty: 30 CAPSULE | Refills: 0 | Status: SHIPPED | OUTPATIENT
Start: 2020-04-06 | End: 2020-05-07 | Stop reason: SDUPTHER

## 2020-05-07 ENCOUNTER — TELEMEDICINE (OUTPATIENT)
Dept: PSYCHIATRY | Facility: CLINIC | Age: 50
End: 2020-05-07
Payer: COMMERCIAL

## 2020-05-07 DIAGNOSIS — F31.60 BIPOLAR 1 DISORDER, MIXED (HCC): ICD-10-CM

## 2020-05-07 DIAGNOSIS — F31.9 BIPOLAR DISORDER WITH DEPRESSION (HCC): ICD-10-CM

## 2020-05-07 DIAGNOSIS — F90.0 ATTENTION DEFICIT HYPERACTIVITY DISORDER (ADHD), PREDOMINANTLY INATTENTIVE TYPE: ICD-10-CM

## 2020-05-07 PROCEDURE — 99214 OFFICE O/P EST MOD 30 MIN: CPT | Performed by: NURSE PRACTITIONER

## 2020-05-07 RX ORDER — TRAZODONE HYDROCHLORIDE 100 MG/1
100 TABLET ORAL
Qty: 30 TABLET | Refills: 5 | Status: SHIPPED | OUTPATIENT
Start: 2020-05-07 | End: 2020-09-09 | Stop reason: SDUPTHER

## 2020-05-07 RX ORDER — DEXTROAMPHETAMINE SACCHARATE, AMPHETAMINE ASPARTATE MONOHYDRATE, DEXTROAMPHETAMINE SULFATE AND AMPHETAMINE SULFATE 5; 5; 5; 5 MG/1; MG/1; MG/1; MG/1
20 CAPSULE, EXTENDED RELEASE ORAL EVERY MORNING
Qty: 30 CAPSULE | Refills: 0 | Status: SHIPPED | OUTPATIENT
Start: 2020-05-07 | End: 2020-06-04 | Stop reason: SDUPTHER

## 2020-05-07 RX ORDER — LORAZEPAM 1 MG/1
1 TABLET ORAL 4 TIMES DAILY PRN
Qty: 120 TABLET | Refills: 2 | Status: SHIPPED | OUTPATIENT
Start: 2020-05-07 | End: 2020-09-09 | Stop reason: SDUPTHER

## 2020-05-07 RX ORDER — LITHIUM CARBONATE 300 MG/1
300 CAPSULE ORAL 2 TIMES DAILY
Qty: 60 CAPSULE | Refills: 5 | Status: SHIPPED | OUTPATIENT
Start: 2020-05-07 | End: 2020-09-09 | Stop reason: SDUPTHER

## 2020-05-07 RX ORDER — CARBAMAZEPINE 200 MG/1
200 TABLET ORAL 2 TIMES DAILY
Qty: 60 TABLET | Refills: 5 | Status: SHIPPED | OUTPATIENT
Start: 2020-05-07 | End: 2020-09-09 | Stop reason: SDUPTHER

## 2020-05-07 RX ORDER — BUPROPION HYDROCHLORIDE 100 MG/1
TABLET, EXTENDED RELEASE ORAL
Qty: 60 TABLET | Refills: 5 | Status: SHIPPED | OUTPATIENT
Start: 2020-05-07 | End: 2020-09-09 | Stop reason: SDUPTHER

## 2020-05-07 RX ORDER — DEXTROAMPHETAMINE SACCHARATE, AMPHETAMINE ASPARTATE MONOHYDRATE, DEXTROAMPHETAMINE SULFATE AND AMPHETAMINE SULFATE 2.5; 2.5; 2.5; 2.5 MG/1; MG/1; MG/1; MG/1
10 CAPSULE, EXTENDED RELEASE ORAL EVERY MORNING
Qty: 30 CAPSULE | Refills: 0 | Status: SHIPPED | OUTPATIENT
Start: 2020-05-07 | End: 2020-06-04 | Stop reason: SDUPTHER

## 2020-06-04 ENCOUNTER — TELEMEDICINE (OUTPATIENT)
Dept: PSYCHIATRY | Facility: CLINIC | Age: 50
End: 2020-06-04
Payer: COMMERCIAL

## 2020-06-04 DIAGNOSIS — F31.32 BIPOLAR AFFECTIVE DISORDER, CURRENTLY DEPRESSED, MODERATE (HCC): Primary | ICD-10-CM

## 2020-06-04 DIAGNOSIS — F90.0 ATTENTION DEFICIT HYPERACTIVITY DISORDER (ADHD), PREDOMINANTLY INATTENTIVE TYPE: ICD-10-CM

## 2020-06-04 PROCEDURE — 99214 OFFICE O/P EST MOD 30 MIN: CPT | Performed by: NURSE PRACTITIONER

## 2020-06-04 RX ORDER — DEXTROAMPHETAMINE SACCHARATE, AMPHETAMINE ASPARTATE MONOHYDRATE, DEXTROAMPHETAMINE SULFATE AND AMPHETAMINE SULFATE 5; 5; 5; 5 MG/1; MG/1; MG/1; MG/1
20 CAPSULE, EXTENDED RELEASE ORAL EVERY MORNING
Qty: 30 CAPSULE | Refills: 0 | Status: SHIPPED | OUTPATIENT
Start: 2020-06-04 | End: 2020-07-08 | Stop reason: SDUPTHER

## 2020-06-04 RX ORDER — DEXTROAMPHETAMINE SACCHARATE, AMPHETAMINE ASPARTATE MONOHYDRATE, DEXTROAMPHETAMINE SULFATE AND AMPHETAMINE SULFATE 2.5; 2.5; 2.5; 2.5 MG/1; MG/1; MG/1; MG/1
10 CAPSULE, EXTENDED RELEASE ORAL EVERY MORNING
Qty: 30 CAPSULE | Refills: 0 | Status: SHIPPED | OUTPATIENT
Start: 2020-06-04 | End: 2020-07-08 | Stop reason: SDUPTHER

## 2020-07-08 ENCOUNTER — TELEPHONE (OUTPATIENT)
Dept: PSYCHIATRY | Facility: CLINIC | Age: 50
End: 2020-07-08

## 2020-07-08 DIAGNOSIS — F90.0 ATTENTION DEFICIT HYPERACTIVITY DISORDER (ADHD), PREDOMINANTLY INATTENTIVE TYPE: ICD-10-CM

## 2020-07-08 RX ORDER — DEXTROAMPHETAMINE SACCHARATE, AMPHETAMINE ASPARTATE MONOHYDRATE, DEXTROAMPHETAMINE SULFATE AND AMPHETAMINE SULFATE 5; 5; 5; 5 MG/1; MG/1; MG/1; MG/1
20 CAPSULE, EXTENDED RELEASE ORAL EVERY MORNING
Qty: 30 CAPSULE | Refills: 0 | Status: SHIPPED | OUTPATIENT
Start: 2020-07-08 | End: 2020-07-08 | Stop reason: DRUGHIGH

## 2020-07-08 RX ORDER — DEXTROAMPHETAMINE SACCHARATE, AMPHETAMINE ASPARTATE MONOHYDRATE, DEXTROAMPHETAMINE SULFATE AND AMPHETAMINE SULFATE 7.5; 7.5; 7.5; 7.5 MG/1; MG/1; MG/1; MG/1
30 CAPSULE, EXTENDED RELEASE ORAL EVERY MORNING
Qty: 30 CAPSULE | Refills: 0 | Status: SHIPPED | OUTPATIENT
Start: 2020-07-08 | End: 2020-09-09 | Stop reason: SDUPTHER

## 2020-07-08 RX ORDER — DEXTROAMPHETAMINE SACCHARATE, AMPHETAMINE ASPARTATE MONOHYDRATE, DEXTROAMPHETAMINE SULFATE AND AMPHETAMINE SULFATE 7.5; 7.5; 7.5; 7.5 MG/1; MG/1; MG/1; MG/1
30 CAPSULE, EXTENDED RELEASE ORAL EVERY MORNING
Qty: 30 CAPSULE | Refills: 0 | Status: SHIPPED | OUTPATIENT
Start: 2020-08-05 | End: 2020-12-14 | Stop reason: SDUPTHER

## 2020-07-08 RX ORDER — DEXTROAMPHETAMINE SACCHARATE, AMPHETAMINE ASPARTATE MONOHYDRATE, DEXTROAMPHETAMINE SULFATE AND AMPHETAMINE SULFATE 5; 5; 5; 5 MG/1; MG/1; MG/1; MG/1
20 CAPSULE, EXTENDED RELEASE ORAL EVERY MORNING
Qty: 30 CAPSULE | Refills: 0 | Status: SHIPPED | OUTPATIENT
Start: 2020-08-05 | End: 2020-07-08 | Stop reason: DRUGHIGH

## 2020-07-08 RX ORDER — DEXTROAMPHETAMINE SACCHARATE, AMPHETAMINE ASPARTATE MONOHYDRATE, DEXTROAMPHETAMINE SULFATE AND AMPHETAMINE SULFATE 2.5; 2.5; 2.5; 2.5 MG/1; MG/1; MG/1; MG/1
10 CAPSULE, EXTENDED RELEASE ORAL EVERY MORNING
Qty: 30 CAPSULE | Refills: 0 | Status: SHIPPED | OUTPATIENT
Start: 2020-08-05 | End: 2020-07-08 | Stop reason: DRUGHIGH

## 2020-07-08 RX ORDER — DEXTROAMPHETAMINE SACCHARATE, AMPHETAMINE ASPARTATE MONOHYDRATE, DEXTROAMPHETAMINE SULFATE AND AMPHETAMINE SULFATE 2.5; 2.5; 2.5; 2.5 MG/1; MG/1; MG/1; MG/1
10 CAPSULE, EXTENDED RELEASE ORAL EVERY MORNING
Qty: 30 CAPSULE | Refills: 0 | Status: SHIPPED | OUTPATIENT
Start: 2020-07-08 | End: 2020-07-08 | Stop reason: DRUGHIGH

## 2020-07-08 NOTE — TELEPHONE ENCOUNTER
Alondra Blackman wife called to request a refill of adderall XR 30mg  Capsule   She said usually 3 individual 30 day scripts are sent to [facility] for 3 months

## 2020-09-09 ENCOUNTER — TELEMEDICINE (OUTPATIENT)
Dept: PSYCHIATRY | Facility: CLINIC | Age: 50
End: 2020-09-09
Payer: COMMERCIAL

## 2020-09-09 DIAGNOSIS — F31.9 BIPOLAR DEPRESSION (HCC): Primary | ICD-10-CM

## 2020-09-09 DIAGNOSIS — F31.60 BIPOLAR 1 DISORDER, MIXED (HCC): ICD-10-CM

## 2020-09-09 DIAGNOSIS — F31.9 BIPOLAR DISORDER WITH DEPRESSION (HCC): ICD-10-CM

## 2020-09-09 DIAGNOSIS — F90.0 ATTENTION DEFICIT HYPERACTIVITY DISORDER (ADHD), PREDOMINANTLY INATTENTIVE TYPE: ICD-10-CM

## 2020-09-09 PROCEDURE — 99214 OFFICE O/P EST MOD 30 MIN: CPT | Performed by: NURSE PRACTITIONER

## 2020-09-09 RX ORDER — LITHIUM CARBONATE 300 MG/1
300 CAPSULE ORAL 2 TIMES DAILY
Qty: 60 CAPSULE | Refills: 5 | Status: SHIPPED | OUTPATIENT
Start: 2020-09-09 | End: 2020-10-07 | Stop reason: SDUPTHER

## 2020-09-09 RX ORDER — LORAZEPAM 1 MG/1
1 TABLET ORAL 4 TIMES DAILY PRN
Qty: 120 TABLET | Refills: 2 | Status: SHIPPED | OUTPATIENT
Start: 2020-09-09 | End: 2020-10-07 | Stop reason: SDUPTHER

## 2020-09-09 RX ORDER — CARBAMAZEPINE 200 MG/1
200 TABLET ORAL 2 TIMES DAILY
Qty: 60 TABLET | Refills: 5 | Status: SHIPPED | OUTPATIENT
Start: 2020-09-09 | End: 2020-10-07 | Stop reason: SDUPTHER

## 2020-09-09 RX ORDER — DEXTROAMPHETAMINE SACCHARATE, AMPHETAMINE ASPARTATE MONOHYDRATE, DEXTROAMPHETAMINE SULFATE AND AMPHETAMINE SULFATE 7.5; 7.5; 7.5; 7.5 MG/1; MG/1; MG/1; MG/1
30 CAPSULE, EXTENDED RELEASE ORAL EVERY MORNING
Qty: 30 CAPSULE | Refills: 0 | Status: SHIPPED | OUTPATIENT
Start: 2020-09-09 | End: 2020-10-06 | Stop reason: SDUPTHER

## 2020-09-09 RX ORDER — BUPROPION HYDROCHLORIDE 100 MG/1
TABLET, EXTENDED RELEASE ORAL
Qty: 60 TABLET | Refills: 5 | Status: SHIPPED | OUTPATIENT
Start: 2020-09-09 | End: 2020-10-07 | Stop reason: SDUPTHER

## 2020-09-09 RX ORDER — TRAZODONE HYDROCHLORIDE 100 MG/1
100 TABLET ORAL
Qty: 30 TABLET | Refills: 5 | Status: SHIPPED | OUTPATIENT
Start: 2020-09-09 | End: 2020-10-07 | Stop reason: SDUPTHER

## 2020-09-09 NOTE — PSYCH
Virtual Regular Visit      Assessment/Plan:    Problem List Items Addressed This Visit     None      Visit Diagnoses     Bipolar depression ([address] )    -  Primary    Relevant Medications    buPROPion (WELLBUTRIN SR) 100 mg 12 hr tablet    lithium carbonate 300 mg capsule    LORazepam (ATIVAN) 1 mg tablet    sertraline (ZOLOFT) 50 mg tablet    traZODone (DESYREL) 100 mg tablet    amphetamine-dextroamphetamine (ADDERALL XR) 30 MG 24 hr capsule    Attention deficit hyperactivity disorder (ADHD), predominantly inattentive type        Relevant Medications    buPROPion (WELLBUTRIN SR) 100 mg 12 hr tablet    lithium carbonate 300 mg capsule    LORazepam (ATIVAN) 1 mg tablet    sertraline (ZOLOFT) 50 mg tablet    traZODone (DESYREL) 100 mg tablet    amphetamine-dextroamphetamine (ADDERALL XR) 30 MG 24 hr capsule    Bipolar disorder with depression (HCC)        Relevant Medications    buPROPion (WELLBUTRIN SR) 100 mg 12 hr tablet    carBAMazepine (TEGretol) 200 mg tablet    lithium carbonate 300 mg capsule    LORazepam (ATIVAN) 1 mg tablet    sertraline (ZOLOFT) 50 mg tablet    traZODone (DESYREL) 100 mg tablet    amphetamine-dextroamphetamine (ADDERALL XR) 30 MG 24 hr capsule    Bipolar 1 disorder, mixed (HCC)        Relevant Medications    buPROPion (WELLBUTRIN SR) 100 mg 12 hr tablet    lithium carbonate 300 mg capsule    LORazepam (ATIVAN) 1 mg tablet    sertraline (ZOLOFT) 50 mg tablet    traZODone (DESYREL) 100 mg tablet    amphetamine-dextroamphetamine (ADDERALL XR) 30 MG 24 hr capsule               Reason for visit is a medication management appointment for treatment of bipolar depression, generalized anxiety disorder and attention deficit disorder  Encounter provider VJ Maharaj    Provider located at [address] [address]      Recent Visits  No visits were found meeting these conditions     Showing recent visits within past 7 days and meeting all other requirements     Today's Visits  Date Type Provider Dept   09/09/20 Telemedicine Melijose NavasYoandy today's visits and meeting all other requirements     Future Appointments  No visits were found meeting these conditions  Showing future appointments within next 150 days and meeting all other requirements        The patient was identified by name and date of birth  Romaine Cui IV was informed that this is a telemedicine visit and that the visit is being conducted through [employer]  My office door was closed  No one else was in the room  He acknowledged consent and understanding of privacy and security of the video platform  The patient has agreed to participate and understands they can discontinue the visit at any time  Patient is aware this is a billable service  Dameon Hidalgo is a 52 y o  male with history of bipolar depressive disorder, generalized anxiety disorder and attention deficit disorder  On examination today, he is functioning fairly well at his baseline  Working well at his job without issue  Denies any breakthrough symptoms of mood instability or lability  Has yet to have his lab work completed so I did remind him of that  He will get that done as soon as he can  He is expressing some concern regarding his wife  Apparently she has some cardiac issues that she is dealing with as well as some nephrology issues  Overall though, life is going well for him as far as work, home and family  We will continue his current medication regimen, we will get labs checked and follow-up with me will be in 3 months  Mental status exam:  Patient is awake and alert and oriented x3  His mood is pleasant  He denies any suicidal homicidal ideation  He denies any psychosis  He denies any mood instability    His speech is clear his thoughts are well organized and coherent goal-directed  Attention span is good  Impulse control is good  Judgment insight are good  Memory is good      HPI     Past Medical History:   Diagnosis Date    ADHD (attention deficit hyperactivity disorder)     Anxiety     B[facility] disorder [address] [address] )     Depression     Psychiatric illness        No past surgical history on file  Current Outpatient Medications   Medication Sig Dispense Refill    amphetamine-dextroamphetamine (ADDERALL XR) 30 MG 24 hr capsule Take 1 capsule (30 mg total) by mouth every morningMax Daily Amount: 30 mg 30 capsule 0    buPROPion (WELLBUTRIN SR) 100 mg 12 hr tablet 1 in AM and 1 at 1PM 60 tablet 5    carBAMazepine (TEGretol) 200 mg tablet Take 1 tablet (200 mg total) by mouth 2 (two) times a day 60 tablet 5    lithium carbonate 300 mg capsule Take 1 capsule (300 mg total) by mouth 2 (two) times a day 60 capsule 5    LORazepam (ATIVAN) 1 mg tablet Take 1 tablet (1 mg total) by mouth 4 (four) times a day as needed for anxiety And 1 extra daily PRN severe anxiety 120 tablet 2    sertraline (ZOLOFT) 50 mg tablet 1 Daily 30 tablet 5    traZODone (DESYREL) 100 mg tablet Take 1 tablet (100 mg total) by mouth daily at bedtime 30 tablet 5    amphetamine-dextroamphetamine (ADDERALL XR) 30 MG 24 hr capsule Take 1 capsule (30 mg total) by mouth every morningMax Daily Amount: 30 mg 30 capsule 0     No current facility-administered medications for this visit  Allergies   Allergen Reactions    Penicillins        Review of Systems    Video Exam    There were no vitals filed for this visit  Physical Exam     I spent 25 minutes with patient today in which greater than 50% of the time was spent in counseling/coordination of care regarding treatment      VIRTUAL VISIT DISCLAIMER    Ellen Lin IV acknowledges that he has consented to an online visit or consultation   He understands that the online visit is based solely on information provided by him, and that, in the absence of a face-to-face physical evaluation by the physician, the diagnosis he receives is both limited and provisional in terms of accuracy and completeness  This is not intended to replace a full medical face-to-face evaluation by the physician  Katelynn Alejandro IV understands and accepts these terms

## 2020-09-09 NOTE — PSYCH
TREATMENT PLAN (Medication Management Only)        [facility]    Name and Date of Birth:  Maximo Moore IV 52 y o  1970  Date of Treatment Plan: September 9, 2020  Diagnosis/Diagnoses:    1  Bipolar depression ([facility] )    2  Attention deficit hyperactivity disorder (ADHD), predominantly inattentive type    3  Bipolar disorder with depression ([facility] )    4  Bipolar 1 disorder, mixed ([facility] )      Strengths/Personal Resources for Self-Care: supportive family, supportive friends  Area/Areas of need (in own words): "Doing well"  1  Long Term Goal: "Continue to have a stable mood"  Target Date: 6 months - 3/9/2021  Person/Persons responsible for completion of goal: Willa Tong  2  Short Term Objective (s) - How will we reach this goal?:   A  Provider new recommended medication/dosage changes and/or continue medication(s): continue current medications as prescribed  B   N/A   C   N/A  Target Date: 6 months - 3/9/2021  Person/Persons Responsible for Completion of Goal: Willa Tong  Progress Towards Goals: stable  Treatment Modality: medication education at every visit  Review due 6 months from date of this plan: 6 months - 3/9/2021  Expected length of service: over 1 year  My Physician/PA/NP and I have developed this plan together and I agree to work on the goals and objectives  I understand the treatment goals that were developed for my treatment

## 2020-10-06 ENCOUNTER — TELEPHONE (OUTPATIENT)
Dept: PSYCHIATRY | Facility: CLINIC | Age: 50
End: 2020-10-06

## 2020-10-06 DIAGNOSIS — F90.0 ATTENTION DEFICIT HYPERACTIVITY DISORDER (ADHD), PREDOMINANTLY INATTENTIVE TYPE: ICD-10-CM

## 2020-10-06 RX ORDER — DEXTROAMPHETAMINE SACCHARATE, AMPHETAMINE ASPARTATE MONOHYDRATE, DEXTROAMPHETAMINE SULFATE AND AMPHETAMINE SULFATE 7.5; 7.5; 7.5; 7.5 MG/1; MG/1; MG/1; MG/1
30 CAPSULE, EXTENDED RELEASE ORAL EVERY MORNING
Qty: 30 CAPSULE | Refills: 0 | Status: SHIPPED | OUTPATIENT
Start: 2020-10-06 | End: 2020-12-07 | Stop reason: SDUPTHER

## 2020-10-07 DIAGNOSIS — F31.60 BIPOLAR 1 DISORDER, MIXED (HCC): ICD-10-CM

## 2020-10-07 DIAGNOSIS — F90.0 ATTENTION DEFICIT HYPERACTIVITY DISORDER (ADHD), PREDOMINANTLY INATTENTIVE TYPE: Primary | ICD-10-CM

## 2020-10-07 DIAGNOSIS — F31.9 BIPOLAR DISORDER WITH DEPRESSION (HCC): ICD-10-CM

## 2020-10-07 RX ORDER — BUPROPION HYDROCHLORIDE 100 MG/1
TABLET, EXTENDED RELEASE ORAL
Qty: 60 TABLET | Refills: 5 | Status: SHIPPED | OUTPATIENT
Start: 2020-10-07 | End: 2021-03-18 | Stop reason: SDUPTHER

## 2020-10-07 RX ORDER — DEXTROAMPHETAMINE SACCHARATE, AMPHETAMINE ASPARTATE MONOHYDRATE, DEXTROAMPHETAMINE SULFATE AND AMPHETAMINE SULFATE 7.5; 7.5; 7.5; 7.5 MG/1; MG/1; MG/1; MG/1
30 CAPSULE, EXTENDED RELEASE ORAL EVERY MORNING
Qty: 30 CAPSULE | Refills: 0 | Status: SHIPPED | OUTPATIENT
Start: 2020-11-04 | End: 2021-03-18 | Stop reason: ALTCHOICE

## 2020-10-07 RX ORDER — LORAZEPAM 1 MG/1
1 TABLET ORAL 4 TIMES DAILY PRN
Qty: 120 TABLET | Refills: 2 | Status: SHIPPED | OUTPATIENT
Start: 2020-10-07 | End: 2021-03-18 | Stop reason: SDUPTHER

## 2020-10-07 RX ORDER — CARBAMAZEPINE 200 MG/1
200 TABLET ORAL 2 TIMES DAILY
Qty: 60 TABLET | Refills: 5 | Status: SHIPPED | OUTPATIENT
Start: 2020-10-07 | End: 2021-03-18 | Stop reason: SDUPTHER

## 2020-10-07 RX ORDER — TRAZODONE HYDROCHLORIDE 100 MG/1
100 TABLET ORAL
Qty: 30 TABLET | Refills: 5 | Status: SHIPPED | OUTPATIENT
Start: 2020-10-07 | End: 2021-03-18 | Stop reason: SDUPTHER

## 2020-10-07 RX ORDER — LITHIUM CARBONATE 300 MG/1
300 CAPSULE ORAL 2 TIMES DAILY
Qty: 60 CAPSULE | Refills: 5 | Status: SHIPPED | OUTPATIENT
Start: 2020-10-07 | End: 2021-03-18 | Stop reason: SDUPTHER

## 2020-12-07 DIAGNOSIS — F90.0 ATTENTION DEFICIT HYPERACTIVITY DISORDER (ADHD), PREDOMINANTLY INATTENTIVE TYPE: ICD-10-CM

## 2020-12-07 RX ORDER — DEXTROAMPHETAMINE SACCHARATE, AMPHETAMINE ASPARTATE MONOHYDRATE, DEXTROAMPHETAMINE SULFATE AND AMPHETAMINE SULFATE 7.5; 7.5; 7.5; 7.5 MG/1; MG/1; MG/1; MG/1
30 CAPSULE, EXTENDED RELEASE ORAL EVERY MORNING
Qty: 30 CAPSULE | Refills: 0 | Status: SHIPPED | OUTPATIENT
Start: 2020-12-07 | End: 2021-03-18 | Stop reason: SDUPTHER

## 2020-12-14 ENCOUNTER — TELEPHONE (OUTPATIENT)
Dept: PSYCHIATRY | Facility: CLINIC | Age: 50
End: 2020-12-14

## 2020-12-14 DIAGNOSIS — F90.0 ATTENTION DEFICIT HYPERACTIVITY DISORDER (ADHD), PREDOMINANTLY INATTENTIVE TYPE: ICD-10-CM

## 2020-12-14 RX ORDER — DEXTROAMPHETAMINE SACCHARATE, AMPHETAMINE ASPARTATE MONOHYDRATE, DEXTROAMPHETAMINE SULFATE AND AMPHETAMINE SULFATE 7.5; 7.5; 7.5; 7.5 MG/1; MG/1; MG/1; MG/1
30 CAPSULE, EXTENDED RELEASE ORAL EVERY MORNING
Qty: 30 CAPSULE | Refills: 0 | Status: SHIPPED | OUTPATIENT
Start: 2020-12-14 | End: 2021-02-08 | Stop reason: SDUPTHER

## 2020-12-14 NOTE — TELEPHONE ENCOUNTER
Jose Francisco Yani wife rogelio usually picks up her husbands medications from [facility]   But due to covid, they've been mailing them, and the medications are currently stuck in [place]   His wife asked if you can give her a call if you can  [phone]

## 2020-12-21 ENCOUNTER — TELEMEDICINE (OUTPATIENT)
Dept: PSYCHIATRY | Facility: CLINIC | Age: 50
End: 2020-12-21
Payer: COMMERCIAL

## 2020-12-21 DIAGNOSIS — F90.0 ATTENTION DEFICIT HYPERACTIVITY DISORDER (ADHD), PREDOMINANTLY INATTENTIVE TYPE: ICD-10-CM

## 2020-12-21 DIAGNOSIS — F31.32 BIPOLAR DISORDER WITH MODERATE DEPRESSION (HCC): Primary | ICD-10-CM

## 2020-12-21 PROCEDURE — 99214 OFFICE O/P EST MOD 30 MIN: CPT | Performed by: NURSE PRACTITIONER

## 2021-02-08 DIAGNOSIS — F90.0 ATTENTION DEFICIT HYPERACTIVITY DISORDER (ADHD), PREDOMINANTLY INATTENTIVE TYPE: ICD-10-CM

## 2021-02-09 RX ORDER — DEXTROAMPHETAMINE SACCHARATE, AMPHETAMINE ASPARTATE MONOHYDRATE, DEXTROAMPHETAMINE SULFATE AND AMPHETAMINE SULFATE 7.5; 7.5; 7.5; 7.5 MG/1; MG/1; MG/1; MG/1
30 CAPSULE, EXTENDED RELEASE ORAL EVERY MORNING
Qty: 30 CAPSULE | Refills: 0 | Status: SHIPPED | OUTPATIENT
Start: 2021-02-09 | End: 2021-03-08 | Stop reason: SDUPTHER

## 2021-03-08 DIAGNOSIS — F90.0 ATTENTION DEFICIT HYPERACTIVITY DISORDER (ADHD), PREDOMINANTLY INATTENTIVE TYPE: ICD-10-CM

## 2021-03-08 RX ORDER — DEXTROAMPHETAMINE SACCHARATE, AMPHETAMINE ASPARTATE MONOHYDRATE, DEXTROAMPHETAMINE SULFATE AND AMPHETAMINE SULFATE 7.5; 7.5; 7.5; 7.5 MG/1; MG/1; MG/1; MG/1
30 CAPSULE, EXTENDED RELEASE ORAL EVERY MORNING
Qty: 30 CAPSULE | Refills: 0 | Status: SHIPPED | OUTPATIENT
Start: 2021-03-08 | End: 2021-03-18 | Stop reason: SDUPTHER

## 2021-03-18 ENCOUNTER — TELEMEDICINE (OUTPATIENT)
Dept: PSYCHIATRY | Facility: CLINIC | Age: 51
End: 2021-03-18
Payer: COMMERCIAL

## 2021-03-18 DIAGNOSIS — F31.32 BIPOLAR DISORDER WITH MODERATE DEPRESSION (HCC): Primary | ICD-10-CM

## 2021-03-18 DIAGNOSIS — F90.0 ATTENTION DEFICIT HYPERACTIVITY DISORDER (ADHD), PREDOMINANTLY INATTENTIVE TYPE: ICD-10-CM

## 2021-03-18 DIAGNOSIS — F31.9 BIPOLAR DISORDER WITH DEPRESSION (HCC): ICD-10-CM

## 2021-03-18 DIAGNOSIS — F31.60 BIPOLAR 1 DISORDER, MIXED (HCC): ICD-10-CM

## 2021-03-18 PROCEDURE — 99213 OFFICE O/P EST LOW 20 MIN: CPT | Performed by: NURSE PRACTITIONER

## 2021-03-18 RX ORDER — DEXTROAMPHETAMINE SACCHARATE, AMPHETAMINE ASPARTATE MONOHYDRATE, DEXTROAMPHETAMINE SULFATE AND AMPHETAMINE SULFATE 7.5; 7.5; 7.5; 7.5 MG/1; MG/1; MG/1; MG/1
30 CAPSULE, EXTENDED RELEASE ORAL EVERY MORNING
Qty: 30 CAPSULE | Refills: 0 | Status: SHIPPED | OUTPATIENT
Start: 2021-03-18 | End: 2021-05-10 | Stop reason: SDUPTHER

## 2021-03-18 RX ORDER — LORAZEPAM 1 MG/1
TABLET ORAL
Qty: 120 TABLET | Refills: 2 | Status: SHIPPED | OUTPATIENT
Start: 2021-03-18 | End: 2021-06-18 | Stop reason: SDUPTHER

## 2021-03-18 RX ORDER — BUPROPION HYDROCHLORIDE 100 MG/1
TABLET, EXTENDED RELEASE ORAL
Qty: 60 TABLET | Refills: 5 | Status: SHIPPED | OUTPATIENT
Start: 2021-03-18 | End: 2021-06-18 | Stop reason: SDUPTHER

## 2021-03-18 RX ORDER — TRAZODONE HYDROCHLORIDE 100 MG/1
100 TABLET ORAL
Qty: 30 TABLET | Refills: 5 | Status: SHIPPED | OUTPATIENT
Start: 2021-03-18 | End: 2021-06-18 | Stop reason: SDUPTHER

## 2021-03-18 RX ORDER — CARBAMAZEPINE 200 MG/1
200 TABLET ORAL 2 TIMES DAILY
Qty: 60 TABLET | Refills: 5 | Status: SHIPPED | OUTPATIENT
Start: 2021-03-18 | End: 2021-06-18 | Stop reason: SDUPTHER

## 2021-03-18 RX ORDER — LITHIUM CARBONATE 300 MG/1
300 CAPSULE ORAL 2 TIMES DAILY
Qty: 60 CAPSULE | Refills: 5 | Status: SHIPPED | OUTPATIENT
Start: 2021-03-18 | End: 2021-06-18 | Stop reason: SDUPTHER

## 2021-03-18 NOTE — PSYCH
Virtual Brief Visit    Assessment/Plan:    Problem List Items Addressed This Visit     None      Visit Diagnoses     Bipolar disorder with moderate depression (HCC)    -  Primary    Relevant Medications    LORazepam (ATIVAN) 1 mg tablet    amphetamine-dextroamphetamine (ADDERALL XR) 30 MG 24 hr capsule    buPROPion (WELLBUTRIN SR) 100 mg 12 hr tablet    lithium carbonate 300 mg capsule    sertraline (ZOLOFT) 50 mg tablet    traZODone (DESYREL) 100 mg tablet    Bipolar 1 disorder, mixed (HCC)        Relevant Medications    LORazepam (ATIVAN) 1 mg tablet    amphetamine-dextroamphetamine (ADDERALL XR) 30 MG 24 hr capsule    buPROPion (WELLBUTRIN SR) 100 mg 12 hr tablet    lithium carbonate 300 mg capsule    sertraline (ZOLOFT) 50 mg tablet    traZODone (DESYREL) 100 mg tablet    Attention deficit hyperactivity disorder (ADHD), predominantly inattentive type        Relevant Medications    LORazepam (ATIVAN) 1 mg tablet    amphetamine-dextroamphetamine (ADDERALL XR) 30 MG 24 hr capsule    buPROPion (WELLBUTRIN SR) 100 mg 12 hr tablet    lithium carbonate 300 mg capsule    sertraline (ZOLOFT) 50 mg tablet    traZODone (DESYREL) 100 mg tablet    Bipolar disorder with depression (HCC)        Relevant Medications    LORazepam (ATIVAN) 1 mg tablet    amphetamine-dextroamphetamine (ADDERALL XR) 30 MG 24 hr capsule    buPROPion (WELLBUTRIN SR) 100 mg 12 hr tablet    carBAMazepine (TEGretol) 200 mg tablet    lithium carbonate 300 mg capsule    sertraline (ZOLOFT) 50 mg tablet    traZODone (DESYREL) 100 mg tablet                Reason for visit is  A medication management appointment for treatment of bipolar depressive disorder, mood disorder, attention deficit disorder  Encounter provider VJ Godfrey    Provider located at [address] [address]    Recent Visits  No visits were found meeting these conditions     Showing recent visits within past 7 days and meeting all other requirements     Today's Visits  Date Type Provider Dept   03/18/21 Telemedicine Yoandy Seth today's visits and meeting all other requirements     Future Appointments  No visits were found meeting these conditions  Showing future appointments within next 150 days and meeting all other requirements        After connecting through telephone, the patient was identified by name and date of birth  Azul Maria IV was informed that this is a telemedicine visit and that the visit is being conducted through telephone  My office door was closed  No one else was in the room  He acknowledged consent and understanding of privacy and security of the platform  The patient has agreed to participate and understands he can discontinue the visit at any time  Patient is aware this is a billable service  Subjective    Shahram Perera is a 52 y o  male   Was a long history of treatment for bipolar depressive disorder  In his history, when the patient 1st came to me, he was experiencing daily suicidal thoughts  It took us months to get to the correct medication regimen for him to function at his optimum  Finally, this current medication regimen he is on, he has been doing very well and has been stable for   Several years  There has never been history of him using more medication that is been prescribed for him  Also, over the last several months, he has reduce the dose of lorazepam that he takes on a daily basis  He is now taking lorazepam 3 times a day rather than 4 to 5 times a day which he was using before  He has a history of severe anxiety and panic which disabled him from the work place for some time  Now he is back to work working in [employer] division of a [employer] and is doing very well    I speak to his wife on a regular basis because she does help organized his medications for him and she agrees  The medications are working well for him to function at his optimum  So for now, we will make no other medication changes or adjustments  He will continue on Adderall XR 30 mg daily, Wellbutrin  mg b i d , Tegretol 200 mg b i d , lithium carbonate 300 mg b i d , Zoloft 50 mg daily, trazodone 100 mg HS and lorazepam 1 mg t i d   Follow-up with me will be in 3 months  He tells me he had labs completed and I am contacting the lab to obtain those results  Mental status exam:  Patient is awake and alert and oriented x3  Mood is stable  Patient is not suicidal, not homicidal and not psychotic  His mood is upbeat and he is robust   Speech is clear and his thoughts are well organized, coherent goal-directed  Attention span is good and improved  Impulse control is excellent  Judgment and insight are good  Memory is good  Past Medical History:   Diagnosis Date    ADHD (attention deficit hyperactivity disorder)     Anxiety     B[facility] [address] )     Depression     Psychiatric illness        No past surgical history on file  Current Outpatient Medications   Medication Sig Dispense Refill    amphetamine-dextroamphetamine (ADDERALL XR) 30 MG 24 hr capsule Take 1 capsule (30 mg total) by mouth every morningMax Daily Amount: 30 mg 30 capsule 0    buPROPion (WELLBUTRIN SR) 100 mg 12 hr tablet 1 in AM and 1 at 1PM 60 tablet 5    carBAMazepine (TEGretol) 200 mg tablet Take 1 tablet (200 mg total) by mouth 2 (two) times a day 60 tablet 5    lithium carbonate 300 mg capsule Take 1 capsule (300 mg total) by mouth 2 (two) times a day 60 capsule 5    LORazepam (ATIVAN) 1 mg tablet 1 TID and 1 extra daily PRN for severe anxiety 120 tablet 2    sertraline (ZOLOFT) 50 mg tablet 1 Daily 30 tablet 5    traZODone (DESYREL) 100 mg tablet Take 1 tablet (100 mg total) by mouth daily at bedtime 30 tablet 5     No current facility-administered medications for this visit           Allergies Allergen Reactions    Penicillins        Review of Systems    There were no vitals filed for this visit  I spent 25 minutes with patient today in which greater than 50% of the time was spent in counseling/coordination of care regarding Medication management and treatment    VIRTUAL VISIT DISCLAIMER    Ella Larose IV acknowledges that he has consented to an online visit or consultation  He understands that the online visit is based solely on information provided by him, and that, in the absence of a face-to-face physical evaluation by the physician, the diagnosis he receives is both limited and provisional in terms of accuracy and completeness  This is not intended to replace a full medical face-to-face evaluation by the physician  Ella Larose IV understands and accepts these terms

## 2021-05-10 ENCOUNTER — TELEPHONE (OUTPATIENT)
Dept: PSYCHIATRY | Facility: CLINIC | Age: 51
End: 2021-05-10

## 2021-05-10 DIAGNOSIS — F90.0 ATTENTION DEFICIT HYPERACTIVITY DISORDER (ADHD), PREDOMINANTLY INATTENTIVE TYPE: ICD-10-CM

## 2021-05-10 RX ORDER — DEXTROAMPHETAMINE SACCHARATE, AMPHETAMINE ASPARTATE MONOHYDRATE, DEXTROAMPHETAMINE SULFATE AND AMPHETAMINE SULFATE 7.5; 7.5; 7.5; 7.5 MG/1; MG/1; MG/1; MG/1
30 CAPSULE, EXTENDED RELEASE ORAL EVERY MORNING
Qty: 30 CAPSULE | Refills: 0 | Status: SHIPPED | OUTPATIENT
Start: 2021-05-10 | End: 2021-10-08 | Stop reason: SDUPTHER

## 2021-05-10 RX ORDER — DEXTROAMPHETAMINE SACCHARATE, AMPHETAMINE ASPARTATE MONOHYDRATE, DEXTROAMPHETAMINE SULFATE AND AMPHETAMINE SULFATE 7.5; 7.5; 7.5; 7.5 MG/1; MG/1; MG/1; MG/1
30 CAPSULE, EXTENDED RELEASE ORAL EVERY MORNING
Qty: 30 CAPSULE | Refills: 0 | Status: SHIPPED | OUTPATIENT
Start: 2021-07-07 | End: 2021-11-04 | Stop reason: SDUPTHER

## 2021-05-10 RX ORDER — DEXTROAMPHETAMINE SACCHARATE, AMPHETAMINE ASPARTATE MONOHYDRATE, DEXTROAMPHETAMINE SULFATE AND AMPHETAMINE SULFATE 7.5; 7.5; 7.5; 7.5 MG/1; MG/1; MG/1; MG/1
30 CAPSULE, EXTENDED RELEASE ORAL EVERY MORNING
Qty: 30 CAPSULE | Refills: 0 | Status: SHIPPED | OUTPATIENT
Start: 2021-06-08 | End: 2021-10-11 | Stop reason: SDUPTHER

## 2021-05-10 NOTE — TELEPHONE ENCOUNTER
Blas Pérez are you able to send 3 scripts for adderall to [facility] ? Ana Connor wife , Debra Mccracken called to ask  He's in need of adderall, but she would like 2 future scripts to be on file if she can to avoid calling each month

## 2021-06-18 ENCOUNTER — TELEMEDICINE (OUTPATIENT)
Dept: PSYCHIATRY | Facility: CLINIC | Age: 51
End: 2021-06-18
Payer: COMMERCIAL

## 2021-06-18 DIAGNOSIS — F31.9 BIPOLAR DEPRESSION (HCC): Primary | ICD-10-CM

## 2021-06-18 PROCEDURE — 99213 OFFICE O/P EST LOW 20 MIN: CPT | Performed by: NURSE PRACTITIONER

## 2021-06-18 NOTE — PSYCH
TREATMENT PLAN (Medication Management Only)        [facility]    Name and Date of Birth:  Romaine Cui IV 52 y o  1970  Date of Treatment Plan: June 18, 2021  Diagnosis/Diagnoses:    1  Bipolar depression [facility])      Strengths/Personal Resources for Self-Care: supportive family, supportive friends  Area/Areas of need (in own words): "  I am doing well"  1  Long Term Goal: " to continue to do well and remained happy"  Target Date: 6 months - 12/18/2021  Person/Persons responsible for completion of goal: Yulia Dong  2  Short Term Objective (s) - How will we reach this goal?:   A  Provider new recommended medication/dosage changes and/or continue medication(s): continue current medications as prescribed  B   N/A  Target Date: 6 months - 12/18/2021  Person/Persons Responsible for Completion of Goal: Yulia Dong  Progress Towards Goals: stable  Treatment Modality: medication education at every visit  Review due 6 months from date of this plan: 6 months - 12/18/2021  Expected length of service: over 1 year  My Physician/PA/NP and I have developed this plan together and I agree to work on the goals and objectives  I understand the treatment goals that were developed for my treatment

## 2021-06-18 NOTE — PSYCH
Virtual Brief Visit    Assessment/Plan:    Problem List Items Addressed This Visit     None      Visit Diagnoses     Bipolar depression ([facility] [address] )    -  Primary                Reason for visit is   Chief Complaint   Patient presents with    Virtual Brief Visit        Encounter provider VJ Lim    Provider located at [address] [address]    Recent Visits  No visits were found meeting these conditions  Showing recent visits within past 7 days and meeting all other requirements  Today's Visits  Date Type Provider Dept   06/18/21 Telemedicine Yoandy Lim Lander today's visits and meeting all other requirements  Future Appointments  No visits were found meeting these conditions  Showing future appointments within next 150 days and meeting all other requirements       After connecting through telephone, the patient was identified by name and date of birth  Carol Tommyestherdelvin PEOPLES was informed that this is a telemedicine visit and that the visit is being conducted through telephone  My office door was closed  No one else was in the room  He acknowledged consent and understanding of privacy and security of the platform  The patient has agreed to participate and understands he can discontinue the visit at any time  Patient is aware this is a billable service  Subjective    Debbie Carver is a 52 y o  male  Who has a history of major depressive disorder, mood disorder and bipolar depression  On examination today, the patient is doing well  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  Otherwise, he is working daily without issue  Both of his sons are doing very well both working and not having any problems  He and his wife are getting along well  There are no new clinical issues or concerns for this gentleman    He will continue on his current medication regimen and we will follow-up with him in 3-6 months or sooner if necessary  He will continue on: Adderall XR 30 mg daily   Wellbutrin  mg b i d  Tegretol 200 mg b i d  Lithium carbonate 300 mg b i d    Lorazepam 1 mg 1 t i d   Patient uses p r n  mostly   Zoloft 50 mg daily   Trazodone 100 mg HS   lithium level 0 4   Tegretol level 5 8  Labs were drawn in March of this year period  Follow-up in  3-6 months or sooner if necessary  Natacha HINTON     Past Medical History:   Diagnosis Date    ADHD (attention deficit hyperactivity disorder)     Anxiety     B[facility] disorder ([facility] [address] )     Depression     Psychiatric illness        No past surgical history on file  Current Outpatient Medications   Medication Sig Dispense Refill    amphetamine-dextroamphetamine (ADDERALL XR) 30 MG 24 hr capsule Take 1 capsule (30 mg total) by mouth every morningMax Daily Amount: 30 mg 30 capsule 0    amphetamine-dextroamphetamine (ADDERALL XR) 30 MG 24 hr capsule Take 1 capsule (30 mg total) by mouth every morningMax Daily Amount: 30 mg 30 capsule 0    [START ON 7/7/2021] amphetamine-dextroamphetamine (ADDERALL XR) 30 MG 24 hr capsule Take 1 capsule (30 mg total) by mouth every morningMax Daily Amount: 30 mg 30 capsule 0    buPROPion (WELLBUTRIN SR) 100 mg 12 hr tablet 1 in AM and 1 at 1PM 60 tablet 5    carBAMazepine (TEGretol) 200 mg tablet Take 1 tablet (200 mg total) by mouth 2 (two) times a day 60 tablet 5    lithium carbonate 300 mg capsule Take 1 capsule (300 mg total) by mouth 2 (two) times a day 60 capsule 5    LORazepam (ATIVAN) 1 mg tablet 1 TID and 1 extra daily PRN for severe anxiety 120 tablet 2    sertraline (ZOLOFT) 50 mg tablet 1 Daily 30 tablet 5    traZODone (DESYREL) 100 mg tablet Take 1 tablet (100 mg total) by mouth daily at bedtime 30 tablet 5     No current facility-administered medications for this visit          Allergies   Allergen Reactions    Penicillins        Review of Systems    There were no vitals filed for this visit  I spent 25 minutes with patient today in which greater than 50% of the time was spent in counseling/coordination of care regarding Medication management and treatment    VIRTUAL VISIT DISCLAIMER    Lucio Martinez IV acknowledges that he has consented to an online visit or consultation  He understands that the online visit is based solely on information provided by him, and that, in the absence of a face-to-face physical evaluation by the physician, the diagnosis he receives is both limited and provisional in terms of accuracy and completeness  This is not intended to replace a full medical face-to-face evaluation by the physician  Lucio Martinez IV understands and accepts these terms

## 2021-10-08 ENCOUNTER — TELEPHONE (OUTPATIENT)
Dept: PSYCHIATRY | Facility: CLINIC | Age: 51
End: 2021-10-08

## 2021-10-08 DIAGNOSIS — F90.0 ATTENTION DEFICIT HYPERACTIVITY DISORDER (ADHD), PREDOMINANTLY INATTENTIVE TYPE: ICD-10-CM

## 2021-10-08 RX ORDER — DEXTROAMPHETAMINE SACCHARATE, AMPHETAMINE ASPARTATE MONOHYDRATE, DEXTROAMPHETAMINE SULFATE AND AMPHETAMINE SULFATE 7.5; 7.5; 7.5; 7.5 MG/1; MG/1; MG/1; MG/1
30 CAPSULE, EXTENDED RELEASE ORAL EVERY MORNING
Qty: 30 CAPSULE | Refills: 0 | Status: SHIPPED | OUTPATIENT
Start: 2021-10-08 | End: 2021-12-20 | Stop reason: SDUPTHER

## 2021-10-08 NOTE — TELEPHONE ENCOUNTER
Ino for rogelio to  her husbands medication   I'm unsure if we spoke about this already, but his wife asked if she can have future scripts sent to avoid calling each month

## 2021-10-18 DIAGNOSIS — F31.9 BIPOLAR DISORDER WITH DEPRESSION (HCC): ICD-10-CM

## 2021-10-18 RX ORDER — LITHIUM CARBONATE 300 MG/1
300 CAPSULE ORAL 2 TIMES DAILY
Qty: 60 CAPSULE | Refills: 5 | Status: SHIPPED | OUTPATIENT
Start: 2021-10-18 | End: 2021-12-20 | Stop reason: SDUPTHER

## 2021-11-04 ENCOUNTER — TELEPHONE (OUTPATIENT)
Dept: PSYCHIATRY | Facility: CLINIC | Age: 51
End: 2021-11-04

## 2021-11-04 NOTE — TELEPHONE ENCOUNTER
Mario Bustamante switched his pharmacy  New pharmacy was able to retrieve all scripts except for adderall   Please send to [facility]

## 2021-12-08 DIAGNOSIS — F31.60 BIPOLAR 1 DISORDER, MIXED (HCC): ICD-10-CM

## 2021-12-08 DIAGNOSIS — F90.0 ATTENTION DEFICIT HYPERACTIVITY DISORDER (ADHD), PREDOMINANTLY INATTENTIVE TYPE: ICD-10-CM

## 2021-12-08 RX ORDER — LORAZEPAM 1 MG/1
TABLET ORAL
Qty: 120 TABLET | Refills: 2 | Status: SHIPPED | OUTPATIENT
Start: 2021-12-08 | End: 2021-12-20 | Stop reason: SDUPTHER

## 2021-12-08 RX ORDER — DEXTROAMPHETAMINE SACCHARATE, AMPHETAMINE ASPARTATE MONOHYDRATE, DEXTROAMPHETAMINE SULFATE AND AMPHETAMINE SULFATE 7.5; 7.5; 7.5; 7.5 MG/1; MG/1; MG/1; MG/1
30 CAPSULE, EXTENDED RELEASE ORAL EVERY MORNING
Qty: 30 CAPSULE | Refills: 0 | Status: SHIPPED | OUTPATIENT
Start: 2021-12-08 | End: 2021-12-20 | Stop reason: SDUPTHER

## 2021-12-20 ENCOUNTER — OFFICE VISIT (OUTPATIENT)
Dept: PSYCHIATRY | Facility: CLINIC | Age: 51
End: 2021-12-20
Payer: COMMERCIAL

## 2021-12-20 DIAGNOSIS — F31.9 BIPOLAR DISORDER WITH DEPRESSION (HCC): ICD-10-CM

## 2021-12-20 DIAGNOSIS — F31.9 BIPOLAR DEPRESSION (HCC): Primary | ICD-10-CM

## 2021-12-20 DIAGNOSIS — F90.0 ATTENTION DEFICIT HYPERACTIVITY DISORDER (ADHD), PREDOMINANTLY INATTENTIVE TYPE: ICD-10-CM

## 2021-12-20 DIAGNOSIS — F31.60 BIPOLAR 1 DISORDER, MIXED (HCC): ICD-10-CM

## 2021-12-20 PROCEDURE — 99214 OFFICE O/P EST MOD 30 MIN: CPT | Performed by: NURSE PRACTITIONER

## 2021-12-20 RX ORDER — BUPROPION HYDROCHLORIDE 100 MG/1
TABLET, EXTENDED RELEASE ORAL
Qty: 60 TABLET | Refills: 5 | Status: SHIPPED | OUTPATIENT
Start: 2021-12-20 | End: 2022-03-30 | Stop reason: SDUPTHER

## 2021-12-20 RX ORDER — LITHIUM CARBONATE 300 MG/1
300 CAPSULE ORAL 2 TIMES DAILY
Qty: 60 CAPSULE | Refills: 5 | Status: SHIPPED | OUTPATIENT
Start: 2021-12-20 | End: 2022-03-30 | Stop reason: SDUPTHER

## 2021-12-20 RX ORDER — DEXTROAMPHETAMINE SACCHARATE, AMPHETAMINE ASPARTATE MONOHYDRATE, DEXTROAMPHETAMINE SULFATE AND AMPHETAMINE SULFATE 7.5; 7.5; 7.5; 7.5 MG/1; MG/1; MG/1; MG/1
30 CAPSULE, EXTENDED RELEASE ORAL EVERY MORNING
Qty: 30 CAPSULE | Refills: 0 | Status: SHIPPED | OUTPATIENT
Start: 2021-12-20 | End: 2022-03-02 | Stop reason: SDUPTHER

## 2021-12-20 RX ORDER — DEXTROAMPHETAMINE SACCHARATE, AMPHETAMINE ASPARTATE MONOHYDRATE, DEXTROAMPHETAMINE SULFATE AND AMPHETAMINE SULFATE 7.5; 7.5; 7.5; 7.5 MG/1; MG/1; MG/1; MG/1
30 CAPSULE, EXTENDED RELEASE ORAL EVERY MORNING
Qty: 30 CAPSULE | Refills: 0 | Status: SHIPPED | OUTPATIENT
Start: 2022-02-07 | End: 2022-06-06 | Stop reason: SDUPTHER

## 2021-12-20 RX ORDER — LORAZEPAM 1 MG/1
TABLET ORAL
Qty: 120 TABLET | Refills: 2 | Status: SHIPPED | OUTPATIENT
Start: 2021-12-20 | End: 2022-03-30

## 2021-12-20 RX ORDER — CARBAMAZEPINE 200 MG/1
200 TABLET ORAL 2 TIMES DAILY
Qty: 60 TABLET | Refills: 5 | Status: SHIPPED | OUTPATIENT
Start: 2021-12-20 | End: 2022-03-30 | Stop reason: SDUPTHER

## 2021-12-20 RX ORDER — TRAZODONE HYDROCHLORIDE 100 MG/1
100 TABLET ORAL
Qty: 30 TABLET | Refills: 5 | Status: SHIPPED | OUTPATIENT
Start: 2021-12-20 | End: 2022-03-30 | Stop reason: SDUPTHER

## 2022-03-02 DIAGNOSIS — F90.0 ATTENTION DEFICIT HYPERACTIVITY DISORDER (ADHD), PREDOMINANTLY INATTENTIVE TYPE: ICD-10-CM

## 2022-03-02 RX ORDER — DEXTROAMPHETAMINE SACCHARATE, AMPHETAMINE ASPARTATE MONOHYDRATE, DEXTROAMPHETAMINE SULFATE AND AMPHETAMINE SULFATE 7.5; 7.5; 7.5; 7.5 MG/1; MG/1; MG/1; MG/1
30 CAPSULE, EXTENDED RELEASE ORAL EVERY MORNING
Qty: 30 CAPSULE | Refills: 0 | Status: SHIPPED | OUTPATIENT
Start: 2022-03-02 | End: 2022-03-30

## 2022-03-30 ENCOUNTER — TELEMEDICINE (OUTPATIENT)
Dept: PSYCHIATRY | Facility: CLINIC | Age: 52
End: 2022-03-30
Payer: COMMERCIAL

## 2022-03-30 DIAGNOSIS — F31.9 BIPOLAR DISORDER WITH DEPRESSION (HCC): ICD-10-CM

## 2022-03-30 DIAGNOSIS — F90.0 ATTENTION DEFICIT HYPERACTIVITY DISORDER (ADHD), PREDOMINANTLY INATTENTIVE TYPE: ICD-10-CM

## 2022-03-30 DIAGNOSIS — F31.60 BIPOLAR 1 DISORDER, MIXED (HCC): ICD-10-CM

## 2022-03-30 DIAGNOSIS — F31.62 BIPOLAR DISORDER, CURRENT EPISODE MIXED, MODERATE (HCC): Primary | ICD-10-CM

## 2022-03-30 PROCEDURE — 99213 OFFICE O/P EST LOW 20 MIN: CPT | Performed by: NURSE PRACTITIONER

## 2022-03-30 RX ORDER — LORAZEPAM 1 MG/1
TABLET ORAL
Qty: 120 TABLET | Refills: 5 | Status: SHIPPED | OUTPATIENT
Start: 2022-03-30

## 2022-03-30 RX ORDER — LITHIUM CARBONATE 300 MG/1
300 CAPSULE ORAL 2 TIMES DAILY
Qty: 60 CAPSULE | Refills: 5 | Status: SHIPPED | OUTPATIENT
Start: 2022-03-30

## 2022-03-30 RX ORDER — BUPROPION HYDROCHLORIDE 100 MG/1
TABLET, EXTENDED RELEASE ORAL
Qty: 60 TABLET | Refills: 5 | Status: SHIPPED | OUTPATIENT
Start: 2022-03-30

## 2022-03-30 RX ORDER — TRAZODONE HYDROCHLORIDE 100 MG/1
100 TABLET ORAL
Qty: 30 TABLET | Refills: 5 | Status: SHIPPED | OUTPATIENT
Start: 2022-03-30

## 2022-03-30 RX ORDER — DEXTROAMPHETAMINE SACCHARATE, AMPHETAMINE ASPARTATE MONOHYDRATE, DEXTROAMPHETAMINE SULFATE AND AMPHETAMINE SULFATE 7.5; 7.5; 7.5; 7.5 MG/1; MG/1; MG/1; MG/1
30 CAPSULE, EXTENDED RELEASE ORAL EVERY MORNING
Qty: 30 CAPSULE | Refills: 0 | Status: SHIPPED | OUTPATIENT
Start: 2022-05-06

## 2022-03-30 RX ORDER — CARBAMAZEPINE 200 MG/1
200 TABLET ORAL 2 TIMES DAILY
Qty: 60 TABLET | Refills: 5 | Status: SHIPPED | OUTPATIENT
Start: 2022-03-30

## 2022-03-30 RX ORDER — DEXTROAMPHETAMINE SACCHARATE, AMPHETAMINE ASPARTATE MONOHYDRATE, DEXTROAMPHETAMINE SULFATE AND AMPHETAMINE SULFATE 7.5; 7.5; 7.5; 7.5 MG/1; MG/1; MG/1; MG/1
30 CAPSULE, EXTENDED RELEASE ORAL EVERY MORNING
Qty: 30 CAPSULE | Refills: 0 | Status: SHIPPED | OUTPATIENT
Start: 2022-04-08

## 2022-03-30 NOTE — PSYCH
Virtual Regular Visit    Verification of patient location:    Patient is located in the following state in which I hold an active license [place]      Assessment/Plan:    Problem List Items Addressed This Visit        Other    Bipolar disorder ([facility] [address] ) - Primary          Goals addressed in session:  Maintain current level of stability  Also complete lab work within the next 3 months  Reason for visit is   Chief Complaint   Patient presents with    Virtual Regular Visit        Encounter provider VJ Meng    Provider located at [address] [id number]      Recent Visits  No visits were found meeting these conditions  Showing recent visits within past 7 days and meeting all other requirements  Future Appointments  No visits were found meeting these conditions  Showing future appointments within next 150 days and meeting all other requirements       The patient was identified by name and date of birth  [facility] was informed that this is a telemedicine visit and that the visit is being conducted through[employer] and patient was informed that this is a secure, HIPAA-compliant platform  He agrees to proceed     My office door was closed  No one else was in the room  He acknowledged consent and understanding of privacy and security of the video platform  The patient has agreed to participate and understands they can discontinue the visit at any time  Patient is aware this is a billable service  Subjective  Jas Rivera is a 54 y o  male who has history of bipolar depressive disorder  The patient is doing very well  He is enjoying his job, is enjoying his family and he reports no breakthrough symptoms of anxiety, depression or mood instability  He is happy and content    He is social   He is sleeping well he is eating well and reports no new clinical issues or concerns over the last several months  He still needs to have his lab work completed and he assures me he will do that before I see him again in 3 months  Otherwise, he will continue on: Adderall XR 30 mg daily   Wellbutrin  mg b i d  Tegretol 200 mg b i d  Lithium carbonate 300 mg b i d    Zoloft 50 mg daily   Trazodone 100 mg HS   Follow-up with me in 3 months  Complete lab work in 3 months  Mental status exam:  Patient is awake and alert and oriented x3  Mood is stable  Patient is not suicidal, not homicidal and not psychotic  Speech is clear and thoughts are well organized, coherent goal-directed  Attention span is good  Impulse control is good  Judgment and insight are good  Memory is good  Geo Dunbar HPI     Past Medical History:   Diagnosis Date    ADHD (attention deficit hyperactivity disorder)     Anxiety     Bipolar disorder ([facility] [address] )     Depression     Psychiatric illness        No past surgical history on file  Current Outpatient Medications   Medication Sig Dispense Refill    amphetamine-dextroamphetamine (ADDERALL XR) 30 MG 24 hr capsule Take 1 capsule (30 mg total) by mouth every morning Max Daily Amount: 30 mg 30 capsule 0    amphetamine-dextroamphetamine (ADDERALL XR, 30MG,) 30 MG 24 hr capsule Take 1 capsule (30 mg total) by mouth every morning Max Daily Amount: 30 mg 30 capsule 0    buPROPion (WELLBUTRIN SR) 100 mg 12 hr tablet 1 in AM and 1 at 1PM 60 tablet 5    carBAMazepine (TEGretol) 200 mg tablet Take 1 tablet (200 mg total) by mouth 2 (two) times a day 60 tablet 5    lithium carbonate 300 mg capsule Take 1 capsule (300 mg total) by mouth 2 (two) times a day 60 capsule 5    LORazepam (ATIVAN) 1 mg tablet 1 TID and 1 extra daily PRN for severe anxiety 120 tablet 2    sertraline (ZOLOFT) 50 mg tablet 1 Daily 30 tablet 5    traZODone (DESYREL) 100 mg tablet Take 1 tablet (100 mg total) by mouth daily at bedtime 30 tablet 5     No current facility-administered medications for this visit  Allergies   Allergen Reactions    Penicillins        Review of Systems    Video Exam    There were no vitals filed for this visit  Physical Exam     I spent 15 minutes with patient today in which greater than 50% of the time was spent in counseling/coordination of care regarding Medication management, treatment and chart review    VIRTUAL VISIT DISCLAIMER    Janis Sheth IV verbally agrees to participate in [employer]  Pt is aware that [employer] could be limited without vital signs or the ability to perform a full hands-on physical exam  Jimmy Delgado IV understands he or the provider may request at any time to terminate the video visit and request the patient to seek care or treatment in person

## 2022-03-30 NOTE — PSYCH
TREATMENT PLAN (Medication Management Only)  Treatment Plan done but not signed at time of office visit due to:  Plan reviewed by phone or in person  and verbal consent given due to [address]    Name and Date of Birth:  Monica Calvillo IV 54 y o  1970  Date of Treatment Plan: March 30, 2022  Diagnosis/Diagnoses:    1  Bipolar disorder, current episode mixed, moderate ([address] )      Strengths/Personal Resources for Self-Care: supportive family, supportive friends  Area/Areas of need (in own words): "I am doing very well"  1  Long Term Goal: "To continue to do well and maintain current level of stability"  Target Date: 6 months - 9/30/2022  Person/Persons responsible for completion of goal: Wes Cockayne  2  Short Term Objective (s) - How will we reach this goal?:   A  Provider new recommended medication/dosage changes and/or continue medication(s): continue current medications as prescribed  B   N/A  Target Date: 6 months - 9/30/2022  Person/Persons Responsible for Completion of Goal: Wes Cockayne  Progress Towards Goals: stable  Treatment Modality: medication education at every visit  Review due 6 months from date of this plan: 6 months - 9/30/2022  Expected length of service: ongoing treatment unless revised  My Physician/PA/NP and I have developed this plan together and I agree to work on the goals and objectives  I understand the treatment goals that were developed for my treatment

## 2022-06-06 ENCOUNTER — TELEPHONE (OUTPATIENT)
Dept: PSYCHIATRY | Facility: CLINIC | Age: 52
End: 2022-06-06

## 2022-06-06 DIAGNOSIS — F90.0 ATTENTION DEFICIT HYPERACTIVITY DISORDER (ADHD), PREDOMINANTLY INATTENTIVE TYPE: ICD-10-CM

## 2022-06-06 RX ORDER — DEXTROAMPHETAMINE SACCHARATE, AMPHETAMINE ASPARTATE MONOHYDRATE, DEXTROAMPHETAMINE SULFATE AND AMPHETAMINE SULFATE 7.5; 7.5; 7.5; 7.5 MG/1; MG/1; MG/1; MG/1
30 CAPSULE, EXTENDED RELEASE ORAL EVERY MORNING
Qty: 30 CAPSULE | Refills: 0 | Status: SHIPPED | OUTPATIENT
Start: 2022-06-06 | End: 2022-08-25 | Stop reason: SDUPTHER

## 2022-06-06 RX ORDER — DEXTROAMPHETAMINE SACCHARATE, AMPHETAMINE ASPARTATE MONOHYDRATE, DEXTROAMPHETAMINE SULFATE AND AMPHETAMINE SULFATE 7.5; 7.5; 7.5; 7.5 MG/1; MG/1; MG/1; MG/1
30 CAPSULE, EXTENDED RELEASE ORAL EVERY MORNING
Qty: 30 CAPSULE | Refills: 0 | Status: SHIPPED | OUTPATIENT
Start: 2022-08-06 | End: 2022-08-25 | Stop reason: SDUPTHER

## 2022-06-06 RX ORDER — DEXTROAMPHETAMINE SACCHARATE, AMPHETAMINE ASPARTATE MONOHYDRATE, DEXTROAMPHETAMINE SULFATE AND AMPHETAMINE SULFATE 7.5; 7.5; 7.5; 7.5 MG/1; MG/1; MG/1; MG/1
30 CAPSULE, EXTENDED RELEASE ORAL EVERY MORNING
Qty: 30 CAPSULE | Refills: 0 | Status: SHIPPED | OUTPATIENT
Start: 2022-07-06 | End: 2022-08-25 | Stop reason: SDUPTHER

## 2022-06-06 NOTE — TELEPHONE ENCOUNTER
Michael Morris wife called to request a refill of adderall, with additional individual future scripts if possible

## 2022-08-25 ENCOUNTER — OFFICE VISIT (OUTPATIENT)
Dept: PSYCHIATRY | Facility: CLINIC | Age: 52
End: 2022-08-25
Payer: COMMERCIAL

## 2022-08-25 DIAGNOSIS — F90.0 ATTENTION DEFICIT HYPERACTIVITY DISORDER (ADHD), PREDOMINANTLY INATTENTIVE TYPE: Primary | ICD-10-CM

## 2022-08-25 DIAGNOSIS — F31.60 BIPOLAR 1 DISORDER, MIXED (HCC): ICD-10-CM

## 2022-08-25 DIAGNOSIS — F31.9 BIPOLAR DISORDER WITH DEPRESSION (HCC): ICD-10-CM

## 2022-08-25 PROCEDURE — 99214 OFFICE O/P EST MOD 30 MIN: CPT | Performed by: NURSE PRACTITIONER

## 2022-08-25 RX ORDER — CARBAMAZEPINE 200 MG/1
200 TABLET ORAL 2 TIMES DAILY
Qty: 60 TABLET | Refills: 5 | Status: SHIPPED | OUTPATIENT
Start: 2022-08-25

## 2022-08-25 RX ORDER — DEXTROAMPHETAMINE SACCHARATE, AMPHETAMINE ASPARTATE MONOHYDRATE, DEXTROAMPHETAMINE SULFATE AND AMPHETAMINE SULFATE 7.5; 7.5; 7.5; 7.5 MG/1; MG/1; MG/1; MG/1
30 CAPSULE, EXTENDED RELEASE ORAL EVERY MORNING
Qty: 30 CAPSULE | Refills: 0 | Status: SHIPPED | OUTPATIENT
Start: 2022-11-04

## 2022-08-25 RX ORDER — TRAZODONE HYDROCHLORIDE 100 MG/1
100 TABLET ORAL
Qty: 30 TABLET | Refills: 5 | Status: SHIPPED | OUTPATIENT
Start: 2022-08-25

## 2022-08-25 RX ORDER — LORAZEPAM 1 MG/1
TABLET ORAL
Qty: 120 TABLET | Refills: 5 | Status: SHIPPED | OUTPATIENT
Start: 2022-08-25

## 2022-08-25 RX ORDER — LITHIUM CARBONATE 300 MG/1
300 CAPSULE ORAL 2 TIMES DAILY
Qty: 60 CAPSULE | Refills: 5 | Status: SHIPPED | OUTPATIENT
Start: 2022-08-25

## 2022-08-25 RX ORDER — BUPROPION HYDROCHLORIDE 100 MG/1
TABLET, EXTENDED RELEASE ORAL
Qty: 60 TABLET | Refills: 5 | Status: SHIPPED | OUTPATIENT
Start: 2022-08-25

## 2022-08-25 RX ORDER — DEXTROAMPHETAMINE SACCHARATE, AMPHETAMINE ASPARTATE MONOHYDRATE, DEXTROAMPHETAMINE SULFATE AND AMPHETAMINE SULFATE 7.5; 7.5; 7.5; 7.5 MG/1; MG/1; MG/1; MG/1
30 CAPSULE, EXTENDED RELEASE ORAL EVERY MORNING
Qty: 30 CAPSULE | Refills: 0 | Status: SHIPPED | OUTPATIENT
Start: 2022-09-05

## 2022-08-25 RX ORDER — DEXTROAMPHETAMINE SACCHARATE, AMPHETAMINE ASPARTATE MONOHYDRATE, DEXTROAMPHETAMINE SULFATE AND AMPHETAMINE SULFATE 7.5; 7.5; 7.5; 7.5 MG/1; MG/1; MG/1; MG/1
30 CAPSULE, EXTENDED RELEASE ORAL EVERY MORNING
Qty: 30 CAPSULE | Refills: 0 | Status: SHIPPED | OUTPATIENT
Start: 2022-10-05

## 2022-08-25 NOTE — PSYCH
TREATMENT PLAN (Medication Management Only)  Treatment Plan done but not signed at time of office visit due to:  Plan reviewed by phone or in person  and verbal consent given due to [address]    Name and Date of Birth:  Layla Uriarte IV 50 y o  1970  Date of Treatment Plan: August 25, 2022  Diagnosis/Diagnoses:    1  Attention deficit hyperactivity disorder (ADHD), predominantly inattentive type    2  Bipolar disorder with depression ([facility] )    3  Bipolar 1 disorder, mixed ([facility] )      Strengths/Personal Resources for Self-Care: supportive family, supportive friends  Area/Areas of need (in own words): "I am feeling good  I recently had a great vacation with my family"  1  Long Term Goal: "To continue to feel well and enjoy life"  Target Date: 6 months - 2/25/2023  Person/Persons responsible for completion of goal: Verena Maravillap  2  Short Term Objective (s) - How will we reach this goal?:   A  Provider new recommended medication/dosage changes and/or continue medication(s): continue current medications as prescribed  B   N/A  Target Date: 6 months - 2/25/2023  Person/Persons Responsible for Completion of Goal: Verena Pako  Progress Towards Goals: stable, continuing treatment  Treatment Modality: medication education at every visit  Review due 6 months from date of this plan: 6 months - 2/25/2023  Expected length of service: ongoing treatment unless revised  My Physician/PA/NP and I have developed this plan together and I agree to work on the goals and objectives  I understand the treatment goals that were developed for my treatment

## 2022-08-25 NOTE — PSYCH
PROGRESS NOTE        Geronimo Marion      Name and Date of Birth:  Josie Rose IV 50 y o  1970    Date of Visit: 08/25/22  Patient is a 57-year-old male who suffers from bipolar depressive disorder  Also suffers from anxiety disorder and attention deficit disorder  This is a patient who suffered for a long time with depression and anxiety and chronic suicidality  He tells me today, he has reached age 50 which he thought he would never make he is very grateful for the way he feels today and that the combination medication he takes maybe somewhat extensive but overall, the combo help same significantly  He has not had a suicidal thought in several years  This medication combination is keeping him healthy, active and interested in life  Most recently, he and his family went on a road trip through [place] and [address] and had a wonderful time  No new clinical issues or concerns  Sleep and appetite are good  Within the next several weeks, he has appointment with his PCP and at that time, he would like to discuss a weight loss plan  That is his major goal at this time  Otherwise, work is going well and he will follow-up with me in 3 months or sooner if necessary  He has yet to have some labs completed but I reminded him to have the labs completed as soon as he could  He denies suicidal ideation, intent or plan at present, has no suicidal ideation, intent or plan at present  He denies any auditory hallucinations and visual hallucinations, denies any other delusional thinking, denies any delusional thinking  He denies any side effects from medications      HPI ROS Appetite Changes and Sleep: normal appetite, normal sleep    Review Of Systems:      Constitutional Negative   ENT Negative   Cardiovascular Negative   Respiratory As Noted in HPI   Gastrointestinal Negative   Genitourinary Negative   Musculoskeletal Negative Integumentary Negative   Neurological Negative   Endocrine Negative   Other Symptoms Negative and None       Laboratory Results: No results found for this or any previous visit      Substance Abuse History:    Social History     Substance and Sexual Activity   Drug Use No       Family Psychiatric History:     Family History   Problem Relation Age of Onset    Alcohol abuse Father     Bipolar disorder Father        The following portions of the patient's history were reviewed and updated as appropriate: past family history, past medical history, past social history, past surgical history and problem list     Social History     Socioeconomic History    Marital status: /Civil Union     Spouse name: Not on file    Number of children: Not on file    Years of education: Not on file    Highest education level: Not on file   Occupational History    Not on file   Tobacco Use    Smoking status: Never Smoker    Smokeless tobacco: Never Used   Substance and Sexual Activity    Alcohol use: No    Drug use: No    Sexual activity: Not on file   Other Topics Concern    Not on file   Social History Narrative    Not on file     Social Determinants of Health     Financial Resource Strain: Not on file   Food Insecurity: Not on file   Transportation Needs: Not on file   Physical Activity: Not on file   Stress: Not on file   Social Connections: Not on file   Intimate Partner Violence: Not on file   Housing Stability: Not on file     Social History     Social History Narrative    Not on file        Social History     Tobacco History     Smoking Status  Never Smoker    Smokeless Tobacco Use  Never Used          Alcohol History     Alcohol Use Status  No          Drug Use     Drug Use Status  No          Sexual Activity     Sexually Active  Not Asked          Activities of Daily Living    Not Asked                     OBJECTIVE:     Mental Status Evaluation:    Appearance age appropriate, casually dressed   Behavior pleasant, cooperative   Speech normal volume, normal pitch   Mood Stable mood   Affect Bright affect   Thought Processes logical   Associations intact associations   Thought Content Normal   Perceptual Disturbances: None   Abnormal Thoughts  Risk Potential Suicidal ideation - None  Homicidal ideation - None  Potential for aggression - No   Orientation oriented to person, place, time/date and situation   Memory recent and remote memory grossly intact   Cosciousness alert and awake   Attention Span attention span and concentration are age appropriate   Intellect Appears to be of Average Intelligence   Insight Good   Judgement Good   Muscle Strength and  Gait muscle strength and tone were normal   Language no difficulty naming common objects   Fund of Knowledge displays adequate knowledge of current events   Pain None   Pain Scale 0       Assessment/Plan:       Diagnoses and all orders for this visit:    Attention deficit hyperactivity disorder (ADHD), predominantly inattentive type  -     amphetamine-dextroamphetamine (ADDERALL XR, 30MG,) 30 MG 24 hr capsule; Take 1 capsule (30 mg total) by mouth every morning Max Daily Amount: 30 mg  -     amphetamine-dextroamphetamine (ADDERALL XR, 30MG,) 30 MG 24 hr capsule; Take 1 capsule (30 mg total) by mouth every morning Max Daily Amount: 30 mg  -     amphetamine-dextroamphetamine (ADDERALL XR, 30MG,) 30 MG 24 hr capsule; Take 1 capsule (30 mg total) by mouth every morning Max Daily Amount: 30 mg    Bipolar disorder with depression (HCC)  -     buPROPion (WELLBUTRIN SR) 100 mg 12 hr tablet; 1 in AM and 1 at 1PM  -     carBAMazepine (TEGretol) 200 mg tablet; Take 1 tablet (200 mg total) by mouth 2 (two) times a day  -     lithium carbonate 300 mg capsule; Take 1 capsule (300 mg total) by mouth 2 (two) times a day  -     traZODone (DESYREL) 100 mg tablet;  Take 1 tablet (100 mg total) by mouth daily at bedtime    Bipolar 1 disorder, mixed (HCC)  -     LORazepam (ATIVAN) 1 mg tablet; 1 TID and 1 extra daily PRN for severe anxiety  -     sertraline (ZOLOFT) 50 mg tablet; 1 Daily          Treatment Recommendations/Precautions:    Continue current medications:  Zoloft 50 mg daily   Adderall XR 30 mg daily   Wellbutrin  mg b i d  Tegretol 200 mg b i d  Lithium carbonate 300 mg b i d  Ativan 1 mg t i d  p r n  for anxiety   Trazodone 100 mg HS    Risks/Benefits      Risks, Benefits And Possible Side Effects Of Medications:    Risks, benefits, and possible side effects of medications explained to patient and patient verbalizes understanding and agreement for treatment  Controlled Medication Discussion:  Patient with no history of any overuse or abuse of controlled substances  Does not operate motor vehicle after taking benzodiazepine          Psychotherapy Provided:     Individual psychotherapy provided: No

## 2022-09-06 ENCOUNTER — TELEPHONE (OUTPATIENT)
Dept: PSYCHIATRY | Facility: CLINIC | Age: 52
End: 2022-09-06

## 2022-09-06 NOTE — TELEPHONE ENCOUNTER
Ángel had some issues over the weekend, and went for blood work at [facility]  Wants to know if you can review in Epic  If not accessible, he will fax it to us if needed

## 2022-09-06 NOTE — TELEPHONE ENCOUNTER
Spoke with patient  He was diagnosed with gastritis secondary to upper right quadrant pain and was seen in the emergency room at [facility]  Blood pressure was elevated but it is attributed mostly to the pain issues he was experiencing  Normally, whenever the patient has had his blood pressure checked, being on Adderall Wellbutrin, his blood pressure was well within normal limits  He will follow-up with his PCP and Gastroenterology  He will continue on all medications that I provided for him  Carbamazepine level was well within normal limits at 6 5 and lithium level was 0 3  Lithium is being given to the patient to help prevent chronic suicidal thoughts which he had prior to being on the lithium

## 2022-11-30 ENCOUNTER — OFFICE VISIT (OUTPATIENT)
Dept: PSYCHIATRY | Facility: CLINIC | Age: 52
End: 2022-11-30

## 2022-11-30 DIAGNOSIS — F90.0 ATTENTION DEFICIT HYPERACTIVITY DISORDER (ADHD), PREDOMINANTLY INATTENTIVE TYPE: ICD-10-CM

## 2022-11-30 DIAGNOSIS — F31.9 BIPOLAR DEPRESSION (HCC): Primary | ICD-10-CM

## 2022-11-30 DIAGNOSIS — F31.60 BIPOLAR 1 DISORDER, MIXED (HCC): ICD-10-CM

## 2022-11-30 DIAGNOSIS — F31.9 BIPOLAR DISORDER WITH DEPRESSION (HCC): ICD-10-CM

## 2022-11-30 RX ORDER — TRAZODONE HYDROCHLORIDE 100 MG/1
100 TABLET ORAL
Qty: 30 TABLET | Refills: 5 | Status: SHIPPED | OUTPATIENT
Start: 2022-11-30

## 2022-11-30 RX ORDER — LORAZEPAM 1 MG/1
TABLET ORAL
Qty: 120 TABLET | Refills: 5 | Status: SHIPPED | OUTPATIENT
Start: 2022-11-30

## 2022-11-30 RX ORDER — CARBAMAZEPINE 200 MG/1
200 TABLET ORAL 2 TIMES DAILY
Qty: 60 TABLET | Refills: 5 | Status: SHIPPED | OUTPATIENT
Start: 2022-11-30

## 2022-11-30 RX ORDER — DEXTROAMPHETAMINE SACCHARATE, AMPHETAMINE ASPARTATE MONOHYDRATE, DEXTROAMPHETAMINE SULFATE AND AMPHETAMINE SULFATE 7.5; 7.5; 7.5; 7.5 MG/1; MG/1; MG/1; MG/1
30 CAPSULE, EXTENDED RELEASE ORAL EVERY MORNING
Qty: 30 CAPSULE | Refills: 0 | Status: SHIPPED | OUTPATIENT
Start: 2023-01-02

## 2022-11-30 RX ORDER — DEXTROAMPHETAMINE SACCHARATE, AMPHETAMINE ASPARTATE MONOHYDRATE, DEXTROAMPHETAMINE SULFATE AND AMPHETAMINE SULFATE 7.5; 7.5; 7.5; 7.5 MG/1; MG/1; MG/1; MG/1
30 CAPSULE, EXTENDED RELEASE ORAL EVERY MORNING
Qty: 30 CAPSULE | Refills: 0 | Status: SHIPPED | OUTPATIENT
Start: 2022-12-02

## 2022-11-30 RX ORDER — BUPROPION HYDROCHLORIDE 100 MG/1
TABLET, EXTENDED RELEASE ORAL
Qty: 60 TABLET | Refills: 5 | Status: SHIPPED | OUTPATIENT
Start: 2022-11-30

## 2022-11-30 RX ORDER — DEXTROAMPHETAMINE SACCHARATE, AMPHETAMINE ASPARTATE MONOHYDRATE, DEXTROAMPHETAMINE SULFATE AND AMPHETAMINE SULFATE 7.5; 7.5; 7.5; 7.5 MG/1; MG/1; MG/1; MG/1
30 CAPSULE, EXTENDED RELEASE ORAL EVERY MORNING
Qty: 30 CAPSULE | Refills: 0 | Status: SHIPPED | OUTPATIENT
Start: 2023-02-02 | End: 2022-11-30 | Stop reason: SDUPTHER

## 2022-11-30 NOTE — PSYCH
PROGRESS NOTE        [facility]      Name and Date of Birth:  Liu Larose IV 50 y o  1970    Date of Visit: 11/30/22    SUBJECTIVE:  Patient is a 51-year-old male who has a history of bipolar depressive disorder  When he comes in today, he tells me right away a as wife commented to him that this is the longest he has been stable in his many years  She is very pleased as well as he with how he is doing  Mood is good  He is happy and content and has energy interest and motivation  No side effects to any medications  He recently had labs completed and his levels look good  He had a recent bout of gastritis and is following up with gastroenterology  Mood most of this is caused by GERD  Sleep is good as well as appetite  No new clinical concerns  Follow-up with me will be in 3 months or sooner if necessary  He denies suicidal ideation, intent or plan at present, has no suicidal ideation, intent or plan at present  He denies any auditory hallucinations and visual hallucinations, denies any other delusional thinking, denies any delusional thinking  He denies any side effects from medications    HPI ROS Appetite Changes and Sleep: normal appetite, normal sleep    Review Of Systems:      Constitutional Negative   ENT Negative   Cardiovascular Negative   Respiratory As Noted in HPI   Gastrointestinal Negative   Genitourinary Negative   Musculoskeletal Negative   Integumentary Negative   Neurological Negative   Endocrine Negative   Other Symptoms Negative and None       Laboratory Results: No results found for this or any previous visit      Substance Abuse History:    Social History     Substance and Sexual Activity   Drug Use No       Family Psychiatric History:     Family History   Problem Relation Age of Onset   • Alcohol abuse Father    • Bipolar disorder Father        The following portions of the patient's history were reviewed and updated as appropriate: past family history, past medical history, past social history, past surgical history and problem list     Social History     Socioeconomic History   • Marital status: /Civil Union     Spouse name: Not on file   • Number of children: Not on file   • Years of education: Not on file   • Highest education level: Not on file   Occupational History   • Not on file   Tobacco Use   • Smoking status: Never   • Smokeless tobacco: Never   Substance and Sexual Activity   • Alcohol use: No   • Drug use: No   • Sexual activity: Not on file   Other Topics Concern   • Not on file   Social History Narrative   • Not on file     Social Determinants of Health     Financial Resource Strain: Not on file   Food Insecurity: Not on file   Transportation Needs: Not on file   Physical Activity: Not on file   Stress: Not on file   Social Connections: Not on file   Intimate Partner Violence: Not on file   Housing Stability: Not on file     Social History     Social History Narrative   • Not on file        Social History     Tobacco History     Smoking Status  Never    Smokeless Tobacco Use  Never          Alcohol History     Alcohol Use Status  No          Drug Use     Drug Use Status  No          Sexual Activity     Sexually Active  Not Asked          Activities of Daily Living    Not Asked                     OBJECTIVE:     Mental Status Evaluation:    Appearance age appropriate, casually dressed   Behavior pleasant, cooperative   Speech normal volume, normal pitch   Mood Stable mood   Affect Bright affect   Thought Processes logical   Associations intact associations   Thought Content Normal   Perceptual Disturbances: None   Abnormal Thoughts  Risk Potential Suicidal ideation - None  Homicidal ideation - None  Potential for aggression - No   Orientation oriented to person, place, time/date and situation   Memory recent and remote memory grossly intact   Cosciousness alert and awake   Attention Span attention span and concentration are age appropriate   Intellect Appears to be of Average Intelligence   Insight Good   Judgement Good   Muscle Strength and  Gait muscle strength and tone were normal   Language no difficulty naming common objects   Fund of Knowledge displays adequate knowledge of current events   Pain None   Pain Scale 0       Assessment/Plan:       Diagnoses and all orders for this visit:    Bipolar depression ([facility] )  -     Comprehensive metabolic panel; Future  -     CBC and differential; Future    Attention deficit hyperactivity disorder (ADHD), predominantly inattentive type  -     amphetamine-dextroamphetamine (ADDERALL XR, 30MG,) 30 MG 24 hr capsule; Take 1 capsule (30 mg total) by mouth every morning Max Daily Amount: 30 mg Do not start before December 2, 2022   -     amphetamine-dextroamphetamine (ADDERALL XR, 30MG,) 30 MG 24 hr capsule; Take 1 capsule (30 mg total) by mouth every morning Max Daily Amount: 30 mg Do not start before January 2, 2023   -     Discontinue: amphetamine-dextroamphetamine (ADDERALL XR, 30MG,) 30 MG 24 hr capsule; Take 1 capsule (30 mg total) by mouth every morning Max Daily Amount: 30 mg Do not start before February 2, 2023  Bipolar disorder with depression ([place] [address] )  -     buPROPion (WELLBUTRIN SR) 100 mg 12 hr tablet; 1 in AM and 1 at 1PM  -     carBAMazepine (TEGretol) 200 mg tablet; Take 1 tablet (200 mg total) by mouth 2 (two) times a day  -     traZODone (DESYREL) 100 mg tablet; Take 1 tablet (100 mg total) by mouth daily at bedtime    Bipolar 1 disorder, mixed (HCC)  -     LORazepam (ATIVAN) 1 mg tablet; 1 TID and 1 extra daily PRN for severe anxiety  -     sertraline (ZOLOFT) 50 mg tablet; 1 Daily          Treatment Recommendations/Precautions:    Continue current medications:  Ativan 1 mg t i d  And may have 1 extra daily as needed for breakthrough anxiety  Zoloft 50 mg daily  Trazodone 100 mg HS  Tegretol 200 mg b i d     Current Tegretol level is 6 5  Wellbutrin SR 100 mg b i d   Lithium carbonate 300 mg b i d     Current lithium level 0 3  No symptoms of mayi present  Risks/Benefits      Risks, Benefits And Possible Side Effects Of Medications:    Risks, benefits, and possible side effects of medications explained to patient and patient verbalizes understanding and agreement for treatment  Controlled Medication Discussion:  No history of any overuse or abuse of controlled substances  Patient does not take benzodiazepine in operating motor vehicle        Psychotherapy Provided:     Individual psychotherapy provided: No

## 2023-02-27 ENCOUNTER — OFFICE VISIT (OUTPATIENT)
Dept: PSYCHIATRY | Facility: CLINIC | Age: 53
End: 2023-02-27

## 2023-02-27 DIAGNOSIS — F31.9 BIPOLAR DISORDER WITH DEPRESSION (HCC): ICD-10-CM

## 2023-02-27 DIAGNOSIS — F90.0 ATTENTION DEFICIT HYPERACTIVITY DISORDER (ADHD), PREDOMINANTLY INATTENTIVE TYPE: ICD-10-CM

## 2023-02-27 DIAGNOSIS — F31.60 BIPOLAR 1 DISORDER, MIXED (HCC): ICD-10-CM

## 2023-02-27 RX ORDER — CARBAMAZEPINE 200 MG/1
200 TABLET ORAL 2 TIMES DAILY
Qty: 60 TABLET | Refills: 5 | Status: SHIPPED | OUTPATIENT
Start: 2023-02-27

## 2023-02-27 RX ORDER — BUPROPION HYDROCHLORIDE 100 MG/1
TABLET, EXTENDED RELEASE ORAL
Qty: 60 TABLET | Refills: 5 | Status: SHIPPED | OUTPATIENT
Start: 2023-02-27

## 2023-02-27 RX ORDER — DEXTROAMPHETAMINE SACCHARATE, AMPHETAMINE ASPARTATE MONOHYDRATE, DEXTROAMPHETAMINE SULFATE AND AMPHETAMINE SULFATE 7.5; 7.5; 7.5; 7.5 MG/1; MG/1; MG/1; MG/1
30 CAPSULE, EXTENDED RELEASE ORAL EVERY MORNING
Qty: 30 CAPSULE | Refills: 0 | Status: SHIPPED | OUTPATIENT
Start: 2023-02-27

## 2023-02-27 RX ORDER — DEXTROAMPHETAMINE SACCHARATE, AMPHETAMINE ASPARTATE MONOHYDRATE, DEXTROAMPHETAMINE SULFATE AND AMPHETAMINE SULFATE 7.5; 7.5; 7.5; 7.5 MG/1; MG/1; MG/1; MG/1
30 CAPSULE, EXTENDED RELEASE ORAL EVERY MORNING
Qty: 30 CAPSULE | Refills: 0 | Status: SHIPPED | OUTPATIENT
Start: 2023-03-24

## 2023-02-27 RX ORDER — TRAZODONE HYDROCHLORIDE 100 MG/1
100 TABLET ORAL
Qty: 30 TABLET | Refills: 5 | Status: SHIPPED | OUTPATIENT
Start: 2023-02-27

## 2023-02-27 RX ORDER — LITHIUM CARBONATE 300 MG/1
300 CAPSULE ORAL 2 TIMES DAILY
Qty: 60 CAPSULE | Refills: 5 | Status: SHIPPED | OUTPATIENT
Start: 2023-02-27

## 2023-02-27 RX ORDER — LORAZEPAM 1 MG/1
TABLET ORAL
Qty: 120 TABLET | Refills: 5 | Status: SHIPPED | OUTPATIENT
Start: 2023-02-27

## 2023-02-27 RX ORDER — DEXTROAMPHETAMINE SACCHARATE, AMPHETAMINE ASPARTATE MONOHYDRATE, DEXTROAMPHETAMINE SULFATE AND AMPHETAMINE SULFATE 7.5; 7.5; 7.5; 7.5 MG/1; MG/1; MG/1; MG/1
CAPSULE, EXTENDED RELEASE ORAL
Qty: 30 CAPSULE | Refills: 0 | Status: SHIPPED | OUTPATIENT
Start: 2023-04-24

## 2023-02-27 NOTE — PSYCH
TREATMENT PLAN (Medication Management Only)  Treatment Plan done but not signed at time of office visit due to:  Plan reviewed by phone or in person  and verbal consent given due to [address]    Name and Date of Birth:  Jo Ann Amezcua IV 50 y o  1970  Date of Treatment Plan: February 27, 2023  Diagnosis/Diagnoses:    1  Bipolar disorder with depression ([facility] )    2  Bipolar 1 disorder, mixed ([facility] )    3  Attention deficit hyperactivity disorder (ADHD), predominantly inattentive type      Strengths/Personal Resources for Self-Care: supportive family, supportive friends  Area/Areas of need (in own words): " I am feeling very good "  1  Long Term Goal: " To continue to feel well and function at my best"  Target Date: 6 months - 8/27/2023  Person/Persons responsible for completion of goal: Nicole Mccall  2  Short Term Objective (s) - How will we reach this goal?:   A  Provider new recommended medication/dosage changes and/or continue medication(s): continue current medications as prescribed  B   N/A  Target Date: 6 months - 8/27/2023  Person/Persons Responsible for Completion of Goal: Nicole Mccall  Progress Towards Goals: stable, continuing treatment  Treatment Modality: medication education at every visit  Review due 6 months from date of this plan: 6 months - 8/27/2023  Expected length of service: ongoing treatment unless revised  My Physician/PA/NP and I have developed this plan together and I agree to work on the goals and objectives  I understand the treatment goals that were developed for my treatment

## 2023-02-27 NOTE — PSYCH
PROGRESS NOTE        [facility]      Name and Date of Birth:  Oksana Calle IV 50 y o  1970    Date of Visit: 02/27/23    SUBJECTIVE: Patient is a 79-year-old male has a history of bipolar depressive disorder  On examination today, he is doing very well  He is pleasant he is happy he is content with his life  His family life is going well  His children are doing well  He has a good relationship with his parents and his sister and her  and her children  Overall, he tells me that he feels that the medications are working well for him and he is having no difficulties emotionally at this time  He will continue with his current medication regimen  His Tegretol level and lithium level that were drawn in September were well within normal limits  No new clinical issues or concerns were discussed just today  No medication side effects  He will follow up with me in 3 months or sooner if necessary  He denies suicidal ideation, intent or plan at present, has no suicidal ideation, intent or plan at present  He denies any auditory hallucinations and visual hallucinations, denies any other delusional thinking, denies any delusional thinking  He denies any side effects from medications    HPI ROS Appetite Changes and Sleep: normal appetite, normal sleep    Review Of Systems:      Constitutional Negative   ENT Negative   Cardiovascular Negative   Respiratory As Noted in HPI   Gastrointestinal Negative   Genitourinary Negative   Musculoskeletal Negative   Integumentary Negative   Neurological Negative   Endocrine Negative   Other Symptoms Negative and None       Laboratory Results: No results found for this or any previous visit      Substance Abuse History:    Social History     Substance and Sexual Activity   Drug Use No       Family Psychiatric History:     Family History   Problem Relation Age of Onset   • Alcohol abuse Father    • Bipolar disorder Father        The following portions of the patient's history were reviewed and updated as appropriate: past family history, past medical history, past social history, past surgical history and problem list     Social History     Socioeconomic History   • Marital status: /Civil Union     Spouse name: Not on file   • Number of children: Not on file   • Years of education: Not on file   • Highest education level: Not on file   Occupational History   • Not on file   Tobacco Use   • Smoking status: Never   • Smokeless tobacco: Never   Substance and Sexual Activity   • Alcohol use: No   • Drug use: No   • Sexual activity: Not on file   Other Topics Concern   • Not on file   Social History Narrative   • Not on file     Social Determinants of Health     Financial Resource Strain: Not on file   Food Insecurity: Not on file   Transportation Needs: Not on file   Physical Activity: Not on file   Stress: Not on file   Social Connections: Not on file   Intimate Partner Violence: Not on file   Housing Stability: Not on file     Social History     Social History Narrative   • Not on file        Social History     Tobacco History     Smoking Status  Never    Smokeless Tobacco Use  Never          Alcohol History     Alcohol Use Status  No          Drug Use     Drug Use Status  No          Sexual Activity     Sexually Active  Not Asked          Activities of Daily Living    Not Asked                     OBJECTIVE:     Mental Status Evaluation:    Appearance age appropriate, casually dressed   Behavior pleasant, cooperative   Speech normal volume, normal pitch   Mood  stable mood   Affect  bright affect   Thought Processes logical   Associations intact associations   Thought Content Normal   Perceptual Disturbances: None   Abnormal Thoughts  Risk Potential Suicidal ideation - None  Homicidal ideation - None  Potential for aggression - No   Orientation oriented to person, place, time/date and situation   Memory recent and remote memory grossly intact   Cosciousness alert and awake   Attention Span attention span and concentration are age appropriate   Intellect Appears to be of Average Intelligence   Insight Good   Judgement Good   Muscle Strength and  Gait muscle strength and tone were normal   Language no difficulty naming common objects   Fund of Knowledge displays adequate knowledge of current events   Pain None   Pain Scale 0       Assessment/Plan:       Diagnoses and all orders for this visit:    Bipolar disorder with depression ([address] )  -     buPROPion (WELLBUTRIN SR) 100 mg 12 hr tablet; 1 in AM and 1 at 1PM  -     carBAMazepine (TEGretol) 200 mg tablet; Take 1 tablet (200 mg total) by mouth 2 (two) times a day  -     lithium carbonate 300 mg capsule; Take 1 capsule (300 mg total) by mouth 2 (two) times a day  -     traZODone (DESYREL) 100 mg tablet; Take 1 tablet (100 mg total) by mouth daily at bedtime    Bipolar 1 disorder, mixed (HCC)  -     LORazepam (ATIVAN) 1 mg tablet; 1 TID and 1 extra daily PRN for severe anxiety  -     sertraline (ZOLOFT) 50 mg tablet; 1 Daily    Attention deficit hyperactivity disorder (ADHD), predominantly inattentive type  -     amphetamine-dextroamphetamine (ADDERALL XR, 30MG,) 30 MG 24 hr capsule; Take 1 capsule (30 mg total) by mouth every morning Max Daily Amount: 30 mg  -     amphetamine-dextroamphetamine (ADDERALL XR, 30MG,) 30 MG 24 hr capsule; Take 1 capsule (30 mg total) by mouth every morning Max Daily Amount: 30 mg Do not start before March 24, 2023   -     amphetamine-dextroamphetamine (ADDERALL XR, 30MG,) 30 MG 24 hr capsule; 1 in AM Do not start before April 24, 2023  Treatment Recommendations/Precautions:    Continue current medications:     Adderall XR 30 mg daily  Zoloft 50 mg daily  Lorazepam 1 mg 3 times daily  Wellbutrin  mg twice daily  Tegretol 200 mg twice daily  Lithium carbonate 300 mg twice daily  Trazodone 100 mg at bedtime  Follow-up with me in 3 months or sooner if necessary  Risks/Benefits      Risks, Benefits And Possible Side Effects Of Medications:    Risks, benefits, and possible side effects of medications explained to patient and patient verbalizes understanding and agreement for treatment  Controlled Medication Discussion: Patient fills all prescriptions on time  No history of any overuse or abuse of any controlled substances  No evidence of dependence or addiction  Psychotherapy Provided:     Individual psychotherapy provided: No   The visit started at noon and ended at 1230  He took time to review the treatment plan, reviewed medications, ordered medications and completed the progress note

## 2023-05-22 ENCOUNTER — TELEMEDICINE (OUTPATIENT)
Dept: PSYCHIATRY | Facility: CLINIC | Age: 53
End: 2023-05-22
Payer: COMMERCIAL

## 2023-05-22 DIAGNOSIS — F90.0 ATTENTION DEFICIT HYPERACTIVITY DISORDER (ADHD), PREDOMINANTLY INATTENTIVE TYPE: ICD-10-CM

## 2023-05-22 DIAGNOSIS — F31.9 BIPOLAR DEPRESSION (HCC): Primary | ICD-10-CM

## 2023-05-22 DIAGNOSIS — F31.60 BIPOLAR 1 DISORDER, MIXED (HCC): ICD-10-CM

## 2023-05-22 DIAGNOSIS — F31.9 BIPOLAR DISORDER WITH DEPRESSION (HCC): ICD-10-CM

## 2023-05-22 PROCEDURE — 99214 OFFICE O/P EST MOD 30 MIN: CPT | Performed by: NURSE PRACTITIONER

## 2023-05-22 RX ORDER — LORAZEPAM 1 MG/1
TABLET ORAL
Qty: 120 TABLET | Refills: 5 | Status: SHIPPED | OUTPATIENT
Start: 2023-05-22

## 2023-05-22 RX ORDER — TRAZODONE HYDROCHLORIDE 100 MG/1
100 TABLET ORAL
Qty: 30 TABLET | Refills: 5 | Status: SHIPPED | OUTPATIENT
Start: 2023-05-22

## 2023-05-22 RX ORDER — DEXTROAMPHETAMINE SACCHARATE, AMPHETAMINE ASPARTATE MONOHYDRATE, DEXTROAMPHETAMINE SULFATE AND AMPHETAMINE SULFATE 7.5; 7.5; 7.5; 7.5 MG/1; MG/1; MG/1; MG/1
30 CAPSULE, EXTENDED RELEASE ORAL EVERY MORNING
Qty: 30 CAPSULE | Refills: 0 | Status: SHIPPED | OUTPATIENT
Start: 2023-05-22

## 2023-05-22 RX ORDER — BUPROPION HYDROCHLORIDE 100 MG/1
TABLET, EXTENDED RELEASE ORAL
Qty: 60 TABLET | Refills: 5 | Status: SHIPPED | OUTPATIENT
Start: 2023-05-22

## 2023-05-22 RX ORDER — DEXTROAMPHETAMINE SACCHARATE, AMPHETAMINE ASPARTATE MONOHYDRATE, DEXTROAMPHETAMINE SULFATE AND AMPHETAMINE SULFATE 7.5; 7.5; 7.5; 7.5 MG/1; MG/1; MG/1; MG/1
30 CAPSULE, EXTENDED RELEASE ORAL EVERY MORNING
Qty: 30 CAPSULE | Refills: 0 | Status: SHIPPED | OUTPATIENT
Start: 2023-07-01

## 2023-05-22 RX ORDER — LITHIUM CARBONATE 300 MG/1
300 CAPSULE ORAL 2 TIMES DAILY
Qty: 60 CAPSULE | Refills: 5 | Status: SHIPPED | OUTPATIENT
Start: 2023-05-22
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
## 2023-08-01 DIAGNOSIS — F90.0 ATTENTION DEFICIT HYPERACTIVITY DISORDER (ADHD), PREDOMINANTLY INATTENTIVE TYPE: ICD-10-CM

## 2023-08-01 RX ORDER — DEXTROAMPHETAMINE SACCHARATE, AMPHETAMINE ASPARTATE MONOHYDRATE, DEXTROAMPHETAMINE SULFATE AND AMPHETAMINE SULFATE 7.5; 7.5; 7.5; 7.5 MG/1; MG/1; MG/1; MG/1
30 CAPSULE, EXTENDED RELEASE ORAL EVERY MORNING
Qty: 30 CAPSULE | Refills: 0 | Status: SHIPPED | OUTPATIENT
Start: 2023-08-01

## 2023-08-15 ENCOUNTER — OFFICE VISIT (OUTPATIENT)
Dept: PSYCHIATRY | Facility: CLINIC | Age: 53
End: 2023-08-15
Payer: COMMERCIAL

## 2023-08-15 DIAGNOSIS — F90.0 ATTENTION DEFICIT HYPERACTIVITY DISORDER (ADHD), PREDOMINANTLY INATTENTIVE TYPE: ICD-10-CM

## 2023-08-15 DIAGNOSIS — F31.9 BIPOLAR DEPRESSION (HCC): Primary | ICD-10-CM

## 2023-08-15 DIAGNOSIS — F31.9 BIPOLAR DISORDER WITH DEPRESSION (HCC): ICD-10-CM

## 2023-08-15 PROCEDURE — 99214 OFFICE O/P EST MOD 30 MIN: CPT | Performed by: NURSE PRACTITIONER

## 2023-08-15 RX ORDER — DEXTROAMPHETAMINE SACCHARATE, AMPHETAMINE ASPARTATE MONOHYDRATE, DEXTROAMPHETAMINE SULFATE AND AMPHETAMINE SULFATE 7.5; 7.5; 7.5; 7.5 MG/1; MG/1; MG/1; MG/1
30 CAPSULE, EXTENDED RELEASE ORAL EVERY MORNING
Qty: 30 CAPSULE | Refills: 0 | Status: SHIPPED | OUTPATIENT
Start: 2023-09-29

## 2023-08-15 RX ORDER — DEXTROAMPHETAMINE SACCHARATE, AMPHETAMINE ASPARTATE MONOHYDRATE, DEXTROAMPHETAMINE SULFATE AND AMPHETAMINE SULFATE 7.5; 7.5; 7.5; 7.5 MG/1; MG/1; MG/1; MG/1
30 CAPSULE, EXTENDED RELEASE ORAL EVERY MORNING
Qty: 30 CAPSULE | Refills: 0 | Status: SHIPPED | OUTPATIENT
Start: 2023-10-30

## 2023-08-15 RX ORDER — DEXTROAMPHETAMINE SACCHARATE, AMPHETAMINE ASPARTATE MONOHYDRATE, DEXTROAMPHETAMINE SULFATE AND AMPHETAMINE SULFATE 7.5; 7.5; 7.5; 7.5 MG/1; MG/1; MG/1; MG/1
30 CAPSULE, EXTENDED RELEASE ORAL EVERY MORNING
Qty: 30 CAPSULE | Refills: 0 | Status: SHIPPED | OUTPATIENT
Start: 2023-08-31

## 2023-08-15 RX ORDER — CARBAMAZEPINE 200 MG/1
200 TABLET ORAL 2 TIMES DAILY
Qty: 60 TABLET | Refills: 5 | Status: SHIPPED | OUTPATIENT
Start: 2023-08-15

## 2023-08-15 NOTE — PSYCH
PROGRESS NOTE        [facility]      Name and Date of Birth:  Jaguar Lawson IV 50 y.o. 1970    Date of Visit: 08/15/23    SUBJECTIVE: Patient is a 53-year-old male who has a history of bipolar depressive disorder. Very well maintained on this current medication regimen. He tells me that if he misses even 1 dose of any of these medications, he begins to feel "different "almost immediately. So we will make no medication changes or adjustments. He has had no suicidal thoughts in several years. He is tolerating all meds well without side effect. He was reminded to have his labs completed when he can. He will try to do that this week. No other new clinical issues or concerns. He is sleeping well and eating well and enjoying his life. He and his family are all getting along jose daniel. He denies suicidal ideation, intent or plan at present, has no suicidal ideation, intent or plan at present. He denies any auditory hallucinations and visual hallucinations, denies any other delusional thinking, denies any delusional thinking. He denies any side effects from medications  . HPI ROS Appetite Changes and Sleep: normal appetite, normal sleep    Review Of Systems:      Constitutional Negative   ENT Negative   Cardiovascular Negative   Respiratory As Noted in HPI   Gastrointestinal Negative   Genitourinary Negative   Musculoskeletal Negative   Integumentary Negative   Neurological Negative   Endocrine Negative   Other Symptoms Negative and None       Laboratory Results: No results found for this or any previous visit.     Substance Abuse History:    Social History     Substance and Sexual Activity   Drug Use No       Family Psychiatric History:     Family History   Problem Relation Age of Onset   • Alcohol abuse Father    • Bipolar disorder Father        The following portions of the patient's history were reviewed and updated as appropriate: past family history, past medical history, past social history, past surgical history and problem list.    Social History     Socioeconomic History   • Marital status: /Civil Union     Spouse name: Not on file   • Number of children: Not on file   • Years of education: Not on file   • Highest education level: Not on file   Occupational History   • Not on file   Tobacco Use   • Smoking status: Never   • Smokeless tobacco: Never   Substance and Sexual Activity   • Alcohol use: No   • Drug use: No   • Sexual activity: Not on file   Other Topics Concern   • Not on file   Social History Narrative   • Not on file     Social Determinants of Health     Financial Resource Strain: Not on file   Food Insecurity: Not on file   Transportation Needs: Not on file   Physical Activity: Not on file   Stress: Not on file   Social Connections: Not on file   Intimate Partner Violence: Not on file   Housing Stability: Not on file     Social History     Social History Narrative   • Not on file        Social History     Tobacco History     Smoking Status  Never    Smokeless Tobacco Use  Never          Alcohol History     Alcohol Use Status  No          Drug Use     Drug Use Status  No          Sexual Activity     Sexually Active  Not Asked          Activities of Daily Living    Not Asked                     OBJECTIVE:     Mental Status Evaluation:    Appearance age appropriate, casually dressed   Behavior pleasant, cooperative   Speech normal volume, normal pitch   Mood  stable mood   Affect  bright affect   Thought Processes logical   Associations intact associations   Thought Content Normal   Perceptual Disturbances: None   Abnormal Thoughts  Risk Potential Suicidal ideation - None  Homicidal ideation - None  Potential for aggression - No   Orientation oriented to person, place, time/date and situation   Memory recent and remote memory grossly intact   Cosciousness alert and awake   Attention Span attention span and concentration are age appropriate   Intellect Appears to be of Average Intelligence   Insight Good   Judgement Good   Muscle Strength and  Gait muscle strength and tone were normal   Language no difficulty naming common objects   Fund of Knowledge displays adequate knowledge of current events   Pain None   Pain Scale 0       Assessment/Plan:       Diagnoses and all orders for this visit:    Bipolar depression ([address])    Attention deficit hyperactivity disorder (ADHD), predominantly inattentive type  -     amphetamine-dextroamphetamine (ADDERALL XR, 30MG,) 30 MG 24 hr capsule; Take 1 capsule (30 mg total) by mouth every morning Max Daily Amount: 30 mg Do not start before August 31, 2023.  -     amphetamine-dextroamphetamine (ADDERALL XR, 30MG,) 30 MG 24 hr capsule; Take 1 capsule (30 mg total) by mouth every morning Max Daily Amount: 30 mg Do not start before September 29, 2023.  -     amphetamine-dextroamphetamine (ADDERALL XR, 30MG,) 30 MG 24 hr capsule; Take 1 capsule (30 mg total) by mouth every morning Max Daily Amount: 30 mg Do not start before October 30, 2023. Bipolar disorder with depression (HCC)  -     carBAMazepine (TEGretol) 200 mg tablet; Take 1 tablet (200 mg total) by mouth 2 (two) times a day          Treatment Recommendations/Precautions:    Continue current medications: Adderall XR 30 mg daily  Wellbutrin  mg twice daily  Zoloft 50 mg daily  Tegretol 200 mg twice daily  Lithium 300 mg twice daily  Ativan 1 mg 3 times daily  Trazodone 100 mg at bedtime  Check labs  Follow-up with me in 3 months or sooner if necessary. Risks/Benefits      Risks, Benefits And Possible Side Effects Of Medications:    Risks, benefits, and possible side effects of medications explained to patient and patient verbalizes understanding and agreement for treatment. Controlled Medication Discussion: Feels all prescriptions on time. No evidence of any lethargy or sedation.       Psychotherapy Provided:     Individual psychotherapy provided: The visit started at 3 PM and ended at 3:20 PM.  20 minutes were spent reviewing the treatment plan, reviewing and we are ordering medications and completing the progress note.

## 2023-08-15 NOTE — PSYCH
TREATMENT PLAN (Medication Management Only)  Treatment Plan done but not signed at time of office visit due to:  Plan reviewed by phone or in person  and verbal consent given due to [address]    Name and Date of Birth:  Maurilio Rios IV 50 y.o. 1970  Date of Treatment Plan: August 15, 2023  Diagnosis/Diagnoses:    1. Bipolar depression ([address])    2. Attention deficit hyperactivity disorder (ADHD), predominantly inattentive type    3. Bipolar disorder with depression [facility]      Strengths/Personal Resources for Self-Care: supportive family, supportive friends. Area/Areas of need (in own words): " I am doing really good. I feel fine.". 1. Long Term Goal: " To continue to do well and maintain this level of functioning". Target Date: 6 months - 2/15/2024  Person/Persons responsible for completion of goal: Shen Diaz  2. Short Term Objective (s) - How will we reach this goal?:   A. Provider new recommended medication/dosage changes and/or continue medication(s): continue current medications as prescribed. B.  N/A. Target Date: 6 months - 2/15/2024  Person/Persons Responsible for Completion of Goal: Shen Diaz  Progress Towards Goals: stable, continuing treatment  Treatment Modality: medication education at every visit  Review due 6 months from date of this plan: 6 months - 2/15/2024  Expected length of service: ongoing treatment unless revised  My Physician/PA/NP and I have developed this plan together and I agree to work on the goals and objectives. I understand the treatment goals that were developed for my treatment.

## 2023-11-28 DIAGNOSIS — F31.9 BIPOLAR DISORDER WITH DEPRESSION (HCC): ICD-10-CM

## 2023-11-28 DIAGNOSIS — F90.0 ATTENTION DEFICIT HYPERACTIVITY DISORDER (ADHD), PREDOMINANTLY INATTENTIVE TYPE: ICD-10-CM

## 2023-11-28 DIAGNOSIS — F31.60 BIPOLAR 1 DISORDER, MIXED (HCC): ICD-10-CM

## 2023-11-28 RX ORDER — BUPROPION HYDROCHLORIDE 100 MG/1
TABLET, EXTENDED RELEASE ORAL
Qty: 60 TABLET | Refills: 5 | Status: SHIPPED | OUTPATIENT
Start: 2023-11-28

## 2023-11-28 RX ORDER — LITHIUM CARBONATE 300 MG/1
300 CAPSULE ORAL 2 TIMES DAILY
Qty: 60 CAPSULE | Refills: 5 | Status: SHIPPED | OUTPATIENT
Start: 2023-11-28

## 2023-11-28 RX ORDER — CARBAMAZEPINE 200 MG/1
200 TABLET ORAL 2 TIMES DAILY
Qty: 60 TABLET | Refills: 5 | Status: SHIPPED | OUTPATIENT
Start: 2023-11-28

## 2023-11-28 RX ORDER — DEXTROAMPHETAMINE SACCHARATE, AMPHETAMINE ASPARTATE MONOHYDRATE, DEXTROAMPHETAMINE SULFATE AND AMPHETAMINE SULFATE 7.5; 7.5; 7.5; 7.5 MG/1; MG/1; MG/1; MG/1
30 CAPSULE, EXTENDED RELEASE ORAL EVERY MORNING
Qty: 30 CAPSULE | Refills: 0 | Status: SHIPPED | OUTPATIENT
Start: 2023-11-28

## 2023-11-28 RX ORDER — LORAZEPAM 1 MG/1
TABLET ORAL
Qty: 120 TABLET | Refills: 5 | Status: SHIPPED | OUTPATIENT
Start: 2023-11-28

## 2023-11-28 RX ORDER — TRAZODONE HYDROCHLORIDE 100 MG/1
100 TABLET ORAL
Qty: 30 TABLET | Refills: 5 | Status: SHIPPED | OUTPATIENT
Start: 2023-11-28

## 2023-11-28 NOTE — TELEPHONE ENCOUNTER
Karoline Melony has an upcoming appt. In December, his wife rogelio called to request refills on all of his current prescriptions.

## 2023-12-20 ENCOUNTER — OFFICE VISIT (OUTPATIENT)
Dept: PSYCHIATRY | Facility: CLINIC | Age: 53
End: 2023-12-20
Payer: COMMERCIAL

## 2023-12-20 DIAGNOSIS — F31.60 BIPOLAR 1 DISORDER, MIXED (HCC): ICD-10-CM

## 2023-12-20 DIAGNOSIS — F31.9 BIPOLAR DISORDER WITH DEPRESSION (HCC): ICD-10-CM

## 2023-12-20 PROCEDURE — 99214 OFFICE O/P EST MOD 30 MIN: CPT | Performed by: NURSE PRACTITIONER

## 2023-12-20 RX ORDER — DEXTROAMPHETAMINE SACCHARATE, AMPHETAMINE ASPARTATE MONOHYDRATE, DEXTROAMPHETAMINE SULFATE AND AMPHETAMINE SULFATE 7.5; 7.5; 7.5; 7.5 MG/1; MG/1; MG/1; MG/1
30 CAPSULE, EXTENDED RELEASE ORAL EVERY MORNING
Qty: 30 CAPSULE | Refills: 0 | Status: SHIPPED | OUTPATIENT
Start: 2023-12-20

## 2023-12-20 RX ORDER — LORAZEPAM 1 MG/1
TABLET ORAL
Qty: 120 TABLET | Refills: 5 | Status: SHIPPED | OUTPATIENT
Start: 2023-12-20

## 2023-12-20 RX ORDER — CARBAMAZEPINE 200 MG/1
200 TABLET ORAL 2 TIMES DAILY
Qty: 60 TABLET | Refills: 5 | Status: SHIPPED | OUTPATIENT
Start: 2023-12-20

## 2023-12-20 RX ORDER — TRAZODONE HYDROCHLORIDE 100 MG/1
100 TABLET ORAL
Qty: 30 TABLET | Refills: 5 | Status: SHIPPED | OUTPATIENT
Start: 2023-12-20

## 2023-12-20 RX ORDER — BUPROPION HYDROCHLORIDE 100 MG/1
TABLET, EXTENDED RELEASE ORAL
Qty: 60 TABLET | Refills: 5 | Status: SHIPPED | OUTPATIENT
Start: 2023-12-20

## 2023-12-20 NOTE — PSYCH
PROGRESS NOTE        [facility] - [facility]      Name and Date of Birth:  Jimmy Dorsey IV 53 y.o. 1970    Date of Visit: 12/20/23    SUBJECTIVE: Patient is a 53-year-old male has a history of bipolar depressive disorder.  Has done remarkably well over the last several years on this medication combination.  He requests we make no change and I agree.  His PCP also requests that no change be made since he is doing fairly well.  Prior to this combination, which was designed over the last several years, the patient had daily thoughts of suicide.  Now, as noted over the last 8+ years, he has had 1 suicidal thought.  He will continue on this current medication regimen.  Everything is going well at home.  His oldest son is engaged.  His youngest son is working but is more of an introvert.  He and his wife are getting along well.  He is sleeping well and eating well and no new medical concerns and no new clinical concerns.  Follow-up in 3 months.        He denies suicidal ideation, intent or plan at present, has no suicidal ideation, intent or plan at present.    He denies any auditory hallucinations and visual hallucinations, denies any other delusional thinking, denies any delusional thinking.    He denies any side effects from medications  .  HPI ROS Appetite Changes and Sleep: normal appetite, normal sleep    Review Of Systems:      Constitutional Negative   ENT Negative   Cardiovascular Negative   Respiratory As Noted in HPI   Gastrointestinal Negative   Genitourinary Negative   Musculoskeletal Negative   Integumentary Negative   Neurological Negative   Endocrine Negative   Other Symptoms Negative and None       Laboratory Results: No results found for this or any previous visit.    Substance Abuse History:    Social History     Substance and Sexual Activity   Drug Use No       Family Psychiatric History:     Family History   Problem Relation Age of Onset    Alcohol  abuse Father     Bipolar disorder Father        The following portions of the patient's history were reviewed and updated as appropriate: past family history, past medical history, past social history, past surgical history and problem list.    Social History     Socioeconomic History    Marital status: /Civil Union     Spouse name: Not on file    Number of children: Not on file    Years of education: Not on file    Highest education level: Not on file   Occupational History    Not on file   Tobacco Use    Smoking status: Never    Smokeless tobacco: Never   Substance and Sexual Activity    Alcohol use: No    Drug use: No    Sexual activity: Not on file   Other Topics Concern    Not on file   Social History Narrative    Not on file     Social Determinants of Health     Financial Resource Strain: Not on file   Food Insecurity: Not on file   Transportation Needs: Not on file   Physical Activity: Not on file   Stress: Not on file   Social Connections: Not on file   Intimate Partner Violence: Not on file   Housing Stability: Not on file     Social History     Social History Narrative    Not on file        Social History       Tobacco History       Smoking Status  Never      Smokeless Tobacco Use  Never              Alcohol History       Alcohol Use Status  No              Drug Use       Drug Use Status  No              Sexual Activity       Sexually Active  Not Asked              Activities of Daily Living    Not Asked                       OBJECTIVE:     Mental Status Evaluation:    Appearance age appropriate, casually dressed   Behavior pleasant, cooperative   Speech normal volume, normal pitch   Mood Stable mood   Affect Bright affect   Thought Processes logical   Associations intact associations   Thought Content No overt delusions   Perceptual Disturbances: none   Abnormal Thoughts  Risk Potential Suicidal ideation - None  Homicidal ideation - None  Potential for aggression - No   Orientation oriented to  person, place, time/date and situation   Memory recent and remote memory grossly intact   Cosciousness alert and awake   Attention Span attention span and concentration are age appropriate   Intellect Appears to be of Average Intelligence   Insight Good   Judgement Good   Muscle Strength and  Gait muscle strength and tone were normal   Language no difficulty naming common objects   Fund of Knowledge displays adequate knowledge of current events   Pain none   Pain Scale 0       Assessment/Plan:       Diagnoses and all orders for this visit:    Bipolar disorder with depression (HCC)  -     amphetamine-dextroamphetamine (ADDERALL XR, 30MG,) 30 MG 24 hr capsule; Take 1 capsule (30 mg total) by mouth every morning Max Daily Amount: 30 mg  -     buPROPion (WELLBUTRIN SR) 100 mg 12 hr tablet; 1 in AM and 1 at 1PM  -     carBAMazepine (TEGretol) 200 mg tablet; Take 1 tablet (200 mg total) by mouth 2 (two) times a day  -     traZODone (DESYREL) 100 mg tablet; Take 1 tablet (100 mg total) by mouth daily at bedtime    Bipolar 1 disorder, mixed (HCC)  -     LORazepam (ATIVAN) 1 mg tablet; 1 TID and 1 extra daily PRN for severe anxiety  -     sertraline (ZOLOFT) 50 mg tablet; 1 Daily          Treatment Recommendations/Precautions:    Continue current medications:  Ativan 1 mg 3 times daily  Zoloft 50 mg daily  Trazodone 100 mg at bedtime  Tegretol 200 mg twice daily  Wellbutrin  mg twice daily  Lithium 300 mg twice daily.  Patient still to get labs checked  Follow-up with me in 3 months or sooner if necessary.    Risks/Benefits      Risks, Benefits And Possible Side Effects Of Medications:    Risks, benefits, and possible side effects of medications explained to patient and patient verbalizes understanding and agreement for treatment.    Controlled Medication Discussion: Patient fills all prescriptions on time.  Wife controls all medications and lays them out for the patient when needed.  No evidence of lethargy or  sedation.  Is very calm and focused.    Crisis plan: Patient is aware of the 24-hour on-call service offered by [facility].  Patient is also aware of the 24 crisis call service offered by his [facility].        Psychotherapy Provided:     Individual psychotherapy provided: No.  The visit started at 3 PM and ended at 3:30 PM.  Time was spent reviewing the treatment plan, reviewing medications, ordering the same and completing the progress note.

## 2024-02-07 DIAGNOSIS — F31.9 BIPOLAR DISORDER WITH DEPRESSION (HCC): ICD-10-CM

## 2024-02-07 RX ORDER — DEXTROAMPHETAMINE SACCHARATE, AMPHETAMINE ASPARTATE MONOHYDRATE, DEXTROAMPHETAMINE SULFATE AND AMPHETAMINE SULFATE 7.5; 7.5; 7.5; 7.5 MG/1; MG/1; MG/1; MG/1
30 CAPSULE, EXTENDED RELEASE ORAL EVERY MORNING
Qty: 30 CAPSULE | Refills: 0 | Status: SHIPPED | OUTPATIENT
Start: 2024-02-07

## 2024-03-12 DIAGNOSIS — F90.0 ATTENTION DEFICIT HYPERACTIVITY DISORDER (ADHD), PREDOMINANTLY INATTENTIVE TYPE: ICD-10-CM

## 2024-03-12 RX ORDER — DEXTROAMPHETAMINE SACCHARATE, AMPHETAMINE ASPARTATE MONOHYDRATE, DEXTROAMPHETAMINE SULFATE AND AMPHETAMINE SULFATE 7.5; 7.5; 7.5; 7.5 MG/1; MG/1; MG/1; MG/1
30 CAPSULE, EXTENDED RELEASE ORAL EVERY MORNING
Qty: 30 CAPSULE | Refills: 0 | Status: SHIPPED | OUTPATIENT
Start: 2024-03-12

## 2024-03-27 ENCOUNTER — TELEMEDICINE (OUTPATIENT)
Dept: PSYCHIATRY | Facility: CLINIC | Age: 54
End: 2024-03-27

## 2024-03-27 DIAGNOSIS — F31.81 BIPOLAR II DISORDER (HCC): Primary | ICD-10-CM

## 2024-03-27 DIAGNOSIS — F90.0 ATTENTION DEFICIT HYPERACTIVITY DISORDER (ADHD), PREDOMINANTLY INATTENTIVE TYPE: ICD-10-CM

## 2024-03-27 DIAGNOSIS — F31.9 BIPOLAR DISORDER WITH DEPRESSION (HCC): ICD-10-CM

## 2024-03-27 RX ORDER — LITHIUM CARBONATE 300 MG/1
300 CAPSULE ORAL 2 TIMES DAILY
Qty: 60 CAPSULE | Refills: 5 | Status: SHIPPED | OUTPATIENT
Start: 2024-03-27

## 2024-03-27 RX ORDER — DEXTROAMPHETAMINE SACCHARATE, AMPHETAMINE ASPARTATE MONOHYDRATE, DEXTROAMPHETAMINE SULFATE AND AMPHETAMINE SULFATE 7.5; 7.5; 7.5; 7.5 MG/1; MG/1; MG/1; MG/1
30 CAPSULE, EXTENDED RELEASE ORAL EVERY MORNING
Qty: 30 CAPSULE | Refills: 0 | Status: SHIPPED | OUTPATIENT
Start: 2024-04-11

## 2024-03-27 NOTE — PSYCH
Virtual Regular Visit    Verification of patient location:    Patient is located at Home in the following state in which I hold an active license [place]      Assessment/Plan:    Problem List Items Addressed This Visit          Behavioral Health    Bipolar II disorder (HCC) - Primary    Relevant Medications    amphetamine-dextroamphetamine (ADDERALL XR, 30MG,) 30 MG 24 hr capsule (Start on 4/11/2024)    lithium carbonate 300 mg capsule     Other Visit Diagnoses       Attention deficit hyperactivity disorder (ADHD), predominantly inattentive type        Relevant Medications    amphetamine-dextroamphetamine (ADDERALL XR, 30MG,) 30 MG 24 hr capsule (Start on 4/11/2024)    lithium carbonate 300 mg capsule    Bipolar disorder with depression (HCC)        Relevant Medications    amphetamine-dextroamphetamine (ADDERALL XR, 30MG,) 30 MG 24 hr capsule (Start on 4/11/2024)    lithium carbonate 300 mg capsule            Goals addressed in session: Maintain current level of stability         Reason for visit is   Chief Complaint   Patient presents with    Virtual Regular Visit          Encounter provider [facility]    Provider located at [address] [id number]      Recent Visits  No visits were found meeting these conditions.  Showing recent visits within past 7 days and meeting all other requirements  Today's Visits  Date Type Provider Dept   03/27/24 Telemedicine [facility]   Showing today's visits and meeting all other requirements  Future Appointments  No visits were found meeting these conditions.  Showing future appointments within next 150 days and meeting all other requirements       The patient was identified by name and date of birth. Jimmy Dorsey IV was informed that this is a telemedicine visit and that the visit is being conducted through[employer] platform. He  agrees to proceed..  My office door was closed. No one else was in the room.  He acknowledged consent and understanding of privacy and security of the video platform. The patient has agreed to participate and understands they can discontinue the visit at any time.    Patient is aware this is a billable service.     Subjective  Jimmy Dorsey IV is a 53 y.o. male with a history of bipolar 2 disorder.  Has been most stable on this medication regimen for several years.  She does not want to change and I am not going to change.  He has been doing remarkably well despite many stressors that have occurred in his life over the years.  He still needs to have lab work done so I will mail out the lab work to him so he could get it done at his clinical laboratory.  He has the labs that he needs completed for his primary care doctor so hopefully we can get this done over the next several months.  Patient is sleeping well and eating well and enjoying life.  Follow-up with me in 3 months or sooner if necessary.  Mental status exam: Patient is awake and alert and oriented x 3.  Mood is stable.  Patient is not suicidal, not homicidal and not psychotic.  Speech is clear and thoughts are well organized, coherent and goal directed.  Associations are intact.  No overt delusions.  Attention span is very good with Adderall.  Impulse control is good.  Judgment and insight are good.  Memory is good.  The patient will continue on:  Adderall XR 30 mg daily  Wellbutrin  mg twice daily  Tegretol 200 mg twice daily  Lithium carbonate 300 mg twice daily  Ativan 1 mg 3 times daily as needed  Zoloft 50 mg daily  Trazodone 100 mg at bedtime  Follow-up with me in 3 months or sooner if necessary.    Safety plan: Patient is aware of the 24-hour on-call service offered by [facility].  Patient is also aware of the 24-hour on-call crisis service offered by the [facility].  He has a very good support system with his wife and family.  Patient agrees to  contact 911 and go directly to the emergency room if ever he begins to experience any suicidal ideation.    Controlled substance discussion: Patient fills all prescriptions on time.  Takes all medications as prescribed.    Visit Start Time:3:00p  Visit Stop Time: 3:25p  Total Visit Duration: The visit lasted 25 minutes.  Time was spent reviewing the treatment plan, reviewing medications and                                             Social History     Socioeconomic History    Marital status: /Civil Union     Spouse name: Not on file    Number of children: Not on file    Years of education: Not on file    Highest education level: Not on file   Occupational History    Not on file   Tobacco Use    Smoking status: Never    Smokeless tobacco: Never   Substance and Sexual Activity    Alcohol use: No    Drug use: No    Sexual activity: Not on file   Other Topics Concern    Not on file   Social History Narrative    Not on file     Social Determinants of Health     Financial Resource Strain: Not on file   Food Insecurity: Not on file   Transportation Needs: Not on file   Physical Activity: Not on file   Stress: Not on file   Social Connections: Not on file   Intimate Partner Violence: Not on file   Housing Stability: Not on file     Social History     Social History Narrative    Not on file                                                 Past Medical History:   Diagnosis Date    ADHD (attention deficit hyperactivity disorder)     Anxiety     Bipolar disorder (HCC)     Depression     Psychiatric illness        No past surgical history on file.    Current Outpatient Medications   Medication Sig Dispense Refill    [START ON 4/11/2024] amphetamine-dextroamphetamine (ADDERALL XR, 30MG,) 30 MG 24 hr capsule Take 1 capsule (30 mg total) by mouth every morning Max Daily Amount: 30 mg Do not start before April 11, 2024. 30 capsule 0    buPROPion (WELLBUTRIN SR) 100 mg 12 hr tablet 1 in AM and 1 at 1PM 60 tablet 5     carBAMazepine (TEGretol) 200 mg tablet Take 1 tablet (200 mg total) by mouth 2 (two) times a day 60 tablet 5    lithium carbonate 300 mg capsule Take 1 capsule (300 mg total) by mouth 2 (two) times a day 60 capsule 5    LORazepam (ATIVAN) 1 mg tablet 1 TID and 1 extra daily PRN for severe anxiety 120 tablet 5    sertraline (ZOLOFT) 50 mg tablet 1 Daily 30 tablet 5    traZODone (DESYREL) 100 mg tablet Take 1 tablet (100 mg total) by mouth daily at bedtime 30 tablet 5     No current facility-administered medications for this visit.        Allergies   Allergen Reactions    Penicillins

## 2024-03-27 NOTE — PSYCH
"TREATMENT PLAN (Medication Management Only)        [facility] - [facility]    Name and Date of Birth:  Jimmy Dorsey IV 53 y.o. 1970  Date of Treatment Plan: March 27, 2024  Diagnosis/Diagnoses:    1. Bipolar II disorder (HCC)    2. Attention deficit hyperactivity disorder (ADHD), predominantly inattentive type    3. Bipolar disorder with depression (HCC)      Strengths/Personal Resources for Self-Care: supportive family, supportive friends.  Area/Areas of need (in own words): \" I am doing very well.  I prefer no medication changes because I am doing okay.\".  1. Long Term Goal: Continues to do well and enjoy life.   Target Date: 6 months - 9/27/2024  Person/Persons responsible for completion of goal: Jimmy  2.  Short Term Objective (s) - How will we reach this goal?:   A.  Provider new recommended medication/dosage changes and/or continue medication(s): continue current medications as prescribed.  B.  N/A.    Target Date: 6 months - 9/27/2024  Person/Persons Responsible for Completion of Goal: Jimmy  Progress Towards Goals: stable, continuing treatment  Treatment Modality: medication education at every visit  Review due 6 months from date of this plan: 6 months - 9/27/2024  Expected length of service: ongoing treatment unless revised  My Physician/PA/NP and I have developed this plan together and I agree to work on the goals and objectives. I understand the treatment goals that were developed for my treatment.  "

## 2024-05-08 DIAGNOSIS — F90.0 ATTENTION DEFICIT HYPERACTIVITY DISORDER (ADHD), PREDOMINANTLY INATTENTIVE TYPE: ICD-10-CM

## 2024-05-08 RX ORDER — DEXTROAMPHETAMINE SACCHARATE, AMPHETAMINE ASPARTATE MONOHYDRATE, DEXTROAMPHETAMINE SULFATE AND AMPHETAMINE SULFATE 7.5; 7.5; 7.5; 7.5 MG/1; MG/1; MG/1; MG/1
30 CAPSULE, EXTENDED RELEASE ORAL EVERY MORNING
Qty: 30 CAPSULE | Refills: 0 | Status: SHIPPED | OUTPATIENT
Start: 2024-05-08

## 2024-06-11 DIAGNOSIS — F90.0 ATTENTION DEFICIT HYPERACTIVITY DISORDER (ADHD), PREDOMINANTLY INATTENTIVE TYPE: ICD-10-CM

## 2024-06-11 RX ORDER — DEXTROAMPHETAMINE SACCHARATE, AMPHETAMINE ASPARTATE MONOHYDRATE, DEXTROAMPHETAMINE SULFATE AND AMPHETAMINE SULFATE 7.5; 7.5; 7.5; 7.5 MG/1; MG/1; MG/1; MG/1
30 CAPSULE, EXTENDED RELEASE ORAL EVERY MORNING
Qty: 30 CAPSULE | Refills: 0 | Status: SHIPPED | OUTPATIENT
Start: 2024-06-11

## 2024-06-11 NOTE — TELEPHONE ENCOUNTER
Patient's wife called to request Adderall 30 mg.   [facility] [place] [id number] [facility]

## 2024-07-09 DIAGNOSIS — F90.0 ATTENTION DEFICIT HYPERACTIVITY DISORDER (ADHD), PREDOMINANTLY INATTENTIVE TYPE: ICD-10-CM

## 2024-07-09 RX ORDER — DEXTROAMPHETAMINE SACCHARATE, AMPHETAMINE ASPARTATE MONOHYDRATE, DEXTROAMPHETAMINE SULFATE AND AMPHETAMINE SULFATE 7.5; 7.5; 7.5; 7.5 MG/1; MG/1; MG/1; MG/1
30 CAPSULE, EXTENDED RELEASE ORAL EVERY MORNING
Qty: 30 CAPSULE | Refills: 0 | Status: SHIPPED | OUTPATIENT
Start: 2024-07-09

## 2024-07-09 NOTE — TELEPHONE ENCOUNTER
Patient's wife called to request Adderall 30 mg.   [facility] [place] [address] [facility]

## 2024-07-24 ENCOUNTER — OFFICE VISIT (OUTPATIENT)
Dept: PSYCHIATRY | Facility: CLINIC | Age: 54
End: 2024-07-24
Payer: COMMERCIAL

## 2024-07-24 DIAGNOSIS — F90.0 ATTENTION DEFICIT HYPERACTIVITY DISORDER (ADHD), PREDOMINANTLY INATTENTIVE TYPE: ICD-10-CM

## 2024-07-24 DIAGNOSIS — F31.9 BIPOLAR DISORDER WITH DEPRESSION (HCC): ICD-10-CM

## 2024-07-24 DIAGNOSIS — F31.60 BIPOLAR 1 DISORDER, MIXED (HCC): ICD-10-CM

## 2024-07-24 PROCEDURE — 99214 OFFICE O/P EST MOD 30 MIN: CPT | Performed by: NURSE PRACTITIONER

## 2024-07-24 RX ORDER — DEXTROAMPHETAMINE SACCHARATE, AMPHETAMINE ASPARTATE MONOHYDRATE, DEXTROAMPHETAMINE SULFATE AND AMPHETAMINE SULFATE 7.5; 7.5; 7.5; 7.5 MG/1; MG/1; MG/1; MG/1
30 CAPSULE, EXTENDED RELEASE ORAL EVERY MORNING
Qty: 30 CAPSULE | Refills: 0 | Status: SHIPPED | OUTPATIENT
Start: 2024-08-08

## 2024-07-24 RX ORDER — TRAZODONE HYDROCHLORIDE 100 MG/1
100 TABLET ORAL
Qty: 30 TABLET | Refills: 5 | Status: SHIPPED | OUTPATIENT
Start: 2024-07-24

## 2024-07-24 RX ORDER — LITHIUM CARBONATE 300 MG/1
300 CAPSULE ORAL 2 TIMES DAILY
Qty: 60 CAPSULE | Refills: 5 | Status: SHIPPED | OUTPATIENT
Start: 2024-07-24

## 2024-07-24 RX ORDER — BUPROPION HYDROCHLORIDE 100 MG/1
TABLET, EXTENDED RELEASE ORAL
Qty: 60 TABLET | Refills: 5 | Status: SHIPPED | OUTPATIENT
Start: 2024-07-24

## 2024-07-24 RX ORDER — LORAZEPAM 1 MG/1
TABLET ORAL
Qty: 120 TABLET | Refills: 5 | Status: SHIPPED | OUTPATIENT
Start: 2024-07-24

## 2024-07-24 RX ORDER — CARBAMAZEPINE 200 MG/1
200 TABLET ORAL 2 TIMES DAILY
Qty: 60 TABLET | Refills: 5 | Status: SHIPPED | OUTPATIENT
Start: 2024-07-24

## 2024-07-24 NOTE — PSYCH
PROGRESS NOTE        [facility]      Name and Date of Birth:  Jimmy Dorsey IV 53 y.o. 1970    Date of Visit: 07/24/24    SUBJECTIVE: Patient is a 53-year-old male has a history of chronic suicidal thoughts.  Under extremely good control since he is on this medication combination.  He has a history of mood disorder and bipolar depressive disorder.  Again doing remarkably well with this combination of medications.  He is happy, robust, enjoying life and responds well to any change that occurs in his life.  No breakthrough anxiety, no breakthrough depression.  He is sleeping well and eating well and offering no complaints.  We will continue him on this current medication regimen.  Labs ordered and he states he will get them completed before next visit in 3 months.        He denies suicidal ideation, intent or plan at present, has no suicidal ideation, intent or plan at present.    He denies any auditory hallucinations and visual hallucinations, denies any other delusional thinking, denies any delusional thinking.    He denies any side effects from medications  .  HPI ROS Appetite Changes and Sleep: normal appetite, normal sleep    Review Of Systems:      Constitutional Negative   ENT Negative   Cardiovascular Negative   Respiratory As Noted in HPI   Gastrointestinal Negative   Genitourinary Negative   Musculoskeletal Negative   Integumentary Negative   Neurological Negative   Endocrine Negative   Other Symptoms Negative and None       Laboratory Results: No results found for this or any previous visit.    Substance Abuse History:    Social History     Substance and Sexual Activity   Drug Use No       Family Psychiatric History:     Family History   Problem Relation Age of Onset    Alcohol abuse Father     Bipolar disorder Father        The following portions of the patient's history were reviewed and updated as appropriate: past family history, past medical  history, past social history, past surgical history and problem list.    Social History     Socioeconomic History    Marital status: /Civil Union     Spouse name: Not on file    Number of children: Not on file    Years of education: Not on file    Highest education level: Not on file   Occupational History    Not on file   Tobacco Use    Smoking status: Never    Smokeless tobacco: Never   Substance and Sexual Activity    Alcohol use: No    Drug use: No    Sexual activity: Not on file   Other Topics Concern    Not on file   Social History Narrative    Not on file     Social Determinants of Health     Financial Resource Strain: Not on file   Food Insecurity: Not on file   Transportation Needs: Not on file   Physical Activity: Not on file   Stress: Not on file   Social Connections: Not on file   Intimate Partner Violence: Not on file   Housing Stability: Not on file     Social History     Social History Narrative    Not on file        Social History       Tobacco History       Smoking Status  Never      Smokeless Tobacco Use  Never              Alcohol History       Alcohol Use Status  No              Drug Use       Drug Use Status  No              Sexual Activity       Sexually Active  Not Asked              Activities of Daily Living    Not Asked                       OBJECTIVE:     Mental Status Evaluation:    Appearance age appropriate, casually dressed   Behavior pleasant, cooperative   Speech normal volume, normal pitch   Mood Stable mood.  Patient happy and robust   Affect Bright affect   Thought Processes logical   Associations intact associations   Thought Content No overt delusions   Perceptual Disturbances: none   Abnormal Thoughts  Risk Potential Suicidal ideation - None  Homicidal ideation - None  Potential for aggression - No   Orientation oriented to person, place, time/date and situation   Memory recent and remote memory grossly intact   Cosciousness alert and awake   Attention Span attention  span and concentration are age appropriate   Intellect Appears to be of Average Intelligence   Insight Good   Judgement Good   Muscle Strength and  Gait muscle strength and tone were normal   Language no difficulty naming common objects   Fund of Knowledge displays adequate knowledge of current events   Pain none   Pain Scale 0       Assessment/Plan:       Diagnoses and all orders for this visit:    Attention deficit hyperactivity disorder (ADHD), predominantly inattentive type  -     amphetamine-dextroamphetamine (ADDERALL XR, 30MG,) 30 MG 24 hr capsule; Take 1 capsule (30 mg total) by mouth every morning Max Daily Amount: 30 mg Do not start before August 8, 2024.    Bipolar disorder with depression (HCC)  -     buPROPion (WELLBUTRIN SR) 100 mg 12 hr tablet; 1 in AM and 1 at 1PM  -     carBAMazepine (TEGretol) 200 mg tablet; Take 1 tablet (200 mg total) by mouth 2 (two) times a day  -     lithium carbonate 300 mg capsule; Take 1 capsule (300 mg total) by mouth 2 (two) times a day  -     traZODone (DESYREL) 100 mg tablet; Take 1 tablet (100 mg total) by mouth daily at bedtime  -     Lithium level; Future  -     Carbamazepine level, total; Future  -     Comprehensive metabolic panel; Future  -     TSH + Free T4; Future    Bipolar 1 disorder, mixed (HCC)  -     LORazepam (ATIVAN) 1 mg tablet; 1 TID and 1 extra daily PRN for severe anxiety  -     sertraline (ZOLOFT) 50 mg tablet; 1 Daily          Treatment Recommendations/Precautions:    Continue current medications:  Adderall XR 30 mg daily  Wellbutrin  mg twice daily  Tegretol 200 mg twice daily  Lithium carbonate 300 mg twice daily  Lorazepam 1 mg 3 times daily as needed  Zoloft 50 mg daily  Trazodone 100 mg at bedtime as needed to help with sleep  CMP  TSH  Tegretol level  Lithium level  Follow-up with me in 3 months or sooner if necessary.    We have discussed their safety plan and pt agrees that if they experience unsafe thoughts that they will reach out to  their supports including this office, the suicide hotline, and emergency services if necessary.       Risks/Benefits      Risks, Benefits And Possible Side Effects Of Medications:    Risks, benefits, and possible side effects of medications explained to patient and patient verbalizes understanding and agreement for treatment.    Controlled Medication Discussion: Fills all prescriptions on time.  Takes all medications as prescribed.  No evidence of lethargy or sedation.  Remains robust, happy and content.        Psychotherapy Provided:     Individual psychotherapy provided: No.  The visit started at 335 and ended at 4 PM.  Time was spent reviewing the treatment plan, updating the treatment plan, ordering medications and completing the progress note.

## 2024-08-07 ENCOUNTER — TELEPHONE (OUTPATIENT)
Age: 54
End: 2024-08-07

## 2024-08-07 NOTE — TELEPHONE ENCOUNTER
Patient has an appt with a specialist and would like to touch base with Radha Alex to go over some things. If possible please give the patient a call.

## 2024-08-07 NOTE — TELEPHONE ENCOUNTER
Spoke with patient.  Patient has decided to consider bariatric surgery.  He is set up to start the process of consultations prior to the actual surgery.  He will need to see a behavioral specialist through [facility] and is concerned that they may change his medication or try to adjust.  He has been stable without suicidal thoughts for over 10 years on this medication regimen.  I advised him that if need be, he can sign a release of information so the behavioral specialist can give me a call.  He was thankful for input and is going to keep me posted.

## 2024-09-09 DIAGNOSIS — F90.0 ATTENTION DEFICIT HYPERACTIVITY DISORDER (ADHD), PREDOMINANTLY INATTENTIVE TYPE: ICD-10-CM

## 2024-09-09 RX ORDER — DEXTROAMPHETAMINE SACCHARATE, AMPHETAMINE ASPARTATE MONOHYDRATE, DEXTROAMPHETAMINE SULFATE AND AMPHETAMINE SULFATE 7.5; 7.5; 7.5; 7.5 MG/1; MG/1; MG/1; MG/1
30 CAPSULE, EXTENDED RELEASE ORAL EVERY MORNING
Qty: 30 CAPSULE | Refills: 0 | Status: SHIPPED | OUTPATIENT
Start: 2024-09-09

## 2024-09-09 NOTE — TELEPHONE ENCOUNTER
Medication:  PDMP  08/09/2024 07/24/2024 Mixed Amphetamine Salts (Capsule, Extended Release) 30.0 30 30 MG [facility] 0 [place]   1 [id number] 07/09/2024 07/09/2024 Mixed Amphetamine Salts (Capsule, Extended Release) 30.0 30 30 MG NA AMAL[facility] Commercial [place] 0 / 0 [place]   1 [id number] 06/11/2024 06/11/2024 Mixed Amphetamine Salts (Capsule, Extended Release) 30.0 30 30 MG NA [facility]  Active agreement on file -

## 2024-09-09 NOTE — TELEPHONE ENCOUNTER
Reason for call:   [x] Refill   [] Prior Auth  [] Other:     Office:   [] PCP/Provider -   [x] Specialty/Provider - Alex-psych    Medication: Adderall 30mg    Dose/Frequency: 1 cap am    Quantity: 30    Pharmacy: [facility] [address] [phone]     Does the patient have enough for 3 days?   [] Yes   [x] No - Send as HP to POD

## 2024-10-07 DIAGNOSIS — F90.0 ATTENTION DEFICIT HYPERACTIVITY DISORDER (ADHD), PREDOMINANTLY INATTENTIVE TYPE: ICD-10-CM

## 2024-10-07 RX ORDER — DEXTROAMPHETAMINE SACCHARATE, AMPHETAMINE ASPARTATE MONOHYDRATE, DEXTROAMPHETAMINE SULFATE AND AMPHETAMINE SULFATE 7.5; 7.5; 7.5; 7.5 MG/1; MG/1; MG/1; MG/1
30 CAPSULE, EXTENDED RELEASE ORAL EVERY MORNING
Qty: 30 CAPSULE | Refills: 0 | Status: SHIPPED | OUTPATIENT
Start: 2024-10-07

## 2024-10-07 NOTE — TELEPHONE ENCOUNTER
Reason for call:   [x] Refill   [] Prior Auth  [] Other:     Office:   [] PCP/Provider -   [x] Specialty/Provider -  Radha Alex,     Medication: Adderall    Dose/Frequency: 30 mg 24 hr capsule    Quantity: #30    Pharmacy: [facility]    Does the patient have enough for 3 days?   [x] Yes   [] No - Send as HP to POD

## 2024-10-07 NOTE — TELEPHONE ENCOUNTER
Medication:  PDMP  09/10/2024 09/09/2024 Mixed Amphetamine Salts (Capsule, Extended Release) 30.0 30 30 MG NA AMALIA [facility] Commercial [place] 0 / 0 [place]   1 [id number] 08/09/2024 07/24/2024 Mixed Amphetamine Salts (Capsule, Extended Release) 30.0 30 30 MG NA AMALIA [facility] [place] / [place]   1 [id number] 07/09/2024 07/09/2024 Mixed Amphetamine Salts (Capsule, Extended Release) 30.0 30 30 MG [facility]  Active agreement on file -

## 2024-10-22 ENCOUNTER — TELEPHONE (OUTPATIENT)
Dept: PSYCHIATRY | Facility: CLINIC | Age: 54
End: 2024-10-22

## 2024-10-23 ENCOUNTER — OFFICE VISIT (OUTPATIENT)
Dept: PSYCHIATRY | Facility: CLINIC | Age: 54
End: 2024-10-23
Payer: COMMERCIAL

## 2024-10-23 DIAGNOSIS — F31.81 BIPOLAR II DISORDER (HCC): Primary | ICD-10-CM

## 2024-10-23 DIAGNOSIS — F90.0 ATTENTION DEFICIT HYPERACTIVITY DISORDER (ADHD), PREDOMINANTLY INATTENTIVE TYPE: ICD-10-CM

## 2024-10-23 DIAGNOSIS — F31.9 BIPOLAR DISORDER WITH DEPRESSION (HCC): ICD-10-CM

## 2024-10-23 PROCEDURE — 99214 OFFICE O/P EST MOD 30 MIN: CPT | Performed by: NURSE PRACTITIONER

## 2024-10-23 RX ORDER — BUPROPION HYDROCHLORIDE 100 MG/1
TABLET, EXTENDED RELEASE ORAL
Start: 2024-10-23

## 2024-10-23 RX ORDER — DEXTROAMPHETAMINE SACCHARATE, AMPHETAMINE ASPARTATE MONOHYDRATE, DEXTROAMPHETAMINE SULFATE AND AMPHETAMINE SULFATE 7.5; 7.5; 7.5; 7.5 MG/1; MG/1; MG/1; MG/1
30 CAPSULE, EXTENDED RELEASE ORAL EVERY MORNING
Qty: 30 CAPSULE | Refills: 0 | Status: SHIPPED | OUTPATIENT
Start: 2024-11-06

## 2024-10-23 RX ORDER — TRAZODONE HYDROCHLORIDE 100 MG/1
100 TABLET ORAL
Start: 2024-10-23

## 2024-10-23 RX ORDER — LITHIUM CARBONATE 300 MG/1
300 CAPSULE ORAL 2 TIMES DAILY
Qty: 60 CAPSULE | Refills: 5 | Status: SHIPPED | OUTPATIENT
Start: 2024-10-23 | End: 2024-10-23 | Stop reason: SDUPTHER

## 2024-10-23 RX ORDER — LORAZEPAM 1 MG/1
TABLET ORAL
Start: 2024-10-23

## 2024-10-23 RX ORDER — LORAZEPAM 1 MG/1
TABLET ORAL
Qty: 120 TABLET | Refills: 5 | Status: SHIPPED | OUTPATIENT
Start: 2024-10-23 | End: 2024-10-23 | Stop reason: SDUPTHER

## 2024-10-23 RX ORDER — CARBAMAZEPINE 200 MG/1
200 TABLET ORAL 2 TIMES DAILY
Start: 2024-10-23

## 2024-10-23 RX ORDER — LITHIUM CARBONATE 300 MG/1
300 CAPSULE ORAL 2 TIMES DAILY
Start: 2024-10-23

## 2024-10-23 RX ORDER — TRAZODONE HYDROCHLORIDE 100 MG/1
100 TABLET ORAL
Qty: 30 TABLET | Refills: 5 | Status: SHIPPED | OUTPATIENT
Start: 2024-10-23 | End: 2024-10-23 | Stop reason: SDUPTHER

## 2024-10-23 RX ORDER — BUPROPION HYDROCHLORIDE 100 MG/1
TABLET, EXTENDED RELEASE ORAL
Qty: 60 TABLET | Refills: 5 | Status: SHIPPED | OUTPATIENT
Start: 2024-10-23 | End: 2024-10-23 | Stop reason: SDUPTHER

## 2024-10-23 NOTE — ASSESSMENT & PLAN NOTE
Orders:    Carbamazepine level, total; Future    Lithium level; Future    buPROPion (WELLBUTRIN SR) 100 mg 12 hr tablet; 1 in AM and 1 at 1PM    carBAMazepine (TEGretol) 200 mg tablet; Take 1 tablet (200 mg total) by mouth 2 (two) times a day    lithium carbonate 300 mg capsule; Take 1 capsule (300 mg total) by mouth 2 (two) times a day    LORazepam (ATIVAN) 1 mg tablet; 1 TID and 1 extra daily PRN for severe anxiety    sertraline (ZOLOFT) 50 mg tablet; 1 Daily    traZODone (DESYREL) 100 mg tablet; Take 1 tablet (100 mg total) by mouth daily at bedtime

## 2024-10-23 NOTE — PSYCH
PROGRESS NOTE        [facility]      Name and Date of Birth:  Jimmy Dorsey IV 54 y.o. 1970    Date of Visit: 10/23/24    SUBJECTIVE:    Patient is a 54-year-old male who suffers from bipolar depressive disorder.  Mood is stable and has been for quite some time.  He told me he did go for lab work but unfortunately did not draw the levels so we will have him go for repeat labs.  Also, he is gearing up to have bariatric surgery hopefully before the end of the year.  Psychiatrically, he is ready to take on the challenge and is attending all the necessary classes.  He is sleeping well and eating well.  We will see him in January or 1 month after he has his bariatric surgery.    He denies suicidal ideation, intent or plan at present, has no suicidal ideation, intent or plan at present.    He denies any auditory hallucinations and visual hallucinations, denies any other delusional thinking, denies any delusional thinking.    He denies any side effects from medications  .  HPI ROS Appetite Changes and Sleep: normal appetite, normal sleep    Review Of Systems:      Constitutional Negative   ENT Negative   Cardiovascular Negative   Respiratory As Noted in HPI   Gastrointestinal Negative   Genitourinary Negative   Musculoskeletal Negative   Integumentary Negative   Neurological Negative   Endocrine Negative   Other Symptoms Negative and None       Laboratory Results: No results found for this or any previous visit.    Substance Abuse History:    Social History     Substance and Sexual Activity   Drug Use No       Family Psychiatric History:     Family History   Problem Relation Age of Onset    Alcohol abuse Father     Bipolar disorder Father        The following portions of the patient's history were reviewed and updated as appropriate: past family history, past medical history, past social history, past surgical history and problem list.    Social History      Socioeconomic History    Marital status: /Civil Union     Spouse name: Not on file    Number of children: Not on file    Years of education: Not on file    Highest education level: Not on file   Occupational History    Not on file   Tobacco Use    Smoking status: Never    Smokeless tobacco: Never   Substance and Sexual Activity    Alcohol use: No    Drug use: No    Sexual activity: Not on file   Other Topics Concern    Not on file   Social History Narrative    Not on file     Social Determinants of Health     Financial Resource Strain: Not on file   Food Insecurity: Not on file   Transportation Needs: Not on file   Physical Activity: Not on file   Stress: Not on file   Social Connections: Not on file   Intimate Partner Violence: Not on file   Housing Stability: Not on file     Social History     Social History Narrative    Not on file        Social History       Tobacco History       Smoking Status  Never      Smokeless Tobacco Use  Never              Alcohol History       Alcohol Use Status  No              Drug Use       Drug Use Status  No              Sexual Activity       Sexually Active  Not Asked              Activities of Daily Living    Not Asked                 We have discussed their safety plan and pt agrees that if they experience unsafe thoughts that they will reach out to their supports including this office, the suicide hotline, and emergency services if necessary.       OBJECTIVE:     Mental Status Evaluation:    Appearance age appropriate, casually dressed   Behavior pleasant, cooperative   Speech normal volume, normal pitch   Mood Stable mood.   Affect Bright affect   Thought Processes logical   Associations intact associations   Thought Content No overt delusions   Perceptual Disturbances: none   Abnormal Thoughts  Risk Potential Suicidal ideation - None  Homicidal ideation - None  Potential for aggression - NNo   Orientation oriented to person, place, time/date and situation   Memory  recent and remote memory grossly intact   Cosciousness alert and awake   Attention Span attention span and concentration are age appropriate   Intellect Appears to be of Average Intelligence   Insight Good.   Judgement Good   Muscle Strength and  Gait muscle strength and tone were normal   Language no difficulty naming common objects   Fund of Knowledge displays adequate knowledge of current events   Pain none   Pain Scale 0       Assessment/Plan:   Patient will continue on:  Adderall XR 30 mg daily  Wellbutrin  mg twice daily  Tegretol 200 mg twice daily  Lithium carbonate 300 mg twice daily  Ativan 1 mg 3 times daily as needed  Zoloft 50 mg daily  Trazodone 100 mg at bedtime  Follow-up with me in 3 months or sooner if necessary.  Tegretol level and lithium level     Assessment & Plan  Bipolar II disorder (HCC)    Orders:    Carbamazepine level, total; Future    Lithium level; Future    buPROPion (WELLBUTRIN SR) 100 mg 12 hr tablet; 1 in AM and 1 at 1PM    carBAMazepine (TEGretol) 200 mg tablet; Take 1 tablet (200 mg total) by mouth 2 (two) times a day    lithium carbonate 300 mg capsule; Take 1 capsule (300 mg total) by mouth 2 (two) times a day    LORazepam (ATIVAN) 1 mg tablet; 1 TID and 1 extra daily PRN for severe anxiety    sertraline (ZOLOFT) 50 mg tablet; 1 Daily    traZODone (DESYREL) 100 mg tablet; Take 1 tablet (100 mg total) by mouth daily at bedtime    Attention deficit hyperactivity disorder (ADHD), predominantly inattentive type    Orders:    amphetamine-dextroamphetamine (ADDERALL XR, 30MG,) 30 MG 24 hr capsule; Take 1 capsule (30 mg total) by mouth every morning Max Daily Amount: 30 mg Do not start before November 6, 2024.    Bipolar disorder with depression (HCC)                      Treatment Recommendations/Precautions:    Treatment status: The patient is doing well psychiatrically.  He awaits word regarding scheduling his bariatric surgery.    Risks/Benefits      Risks, Benefits And  Possible Side Effects Of Medications:    Risks, benefits, and possible side effects of medications explained to patient and patient verbalizes understanding and agreement for treatment.    Controlled Medication Discussion: Fills all prescriptions on time.  No evidence of lethargy or sedation.  Does not take benzodiazepine and operate a motor vehicle.        Psychotherapy Provided:     Individual psychotherapy provided: No.  The visit started at 3:30 PM and ended at 4 PM.  Time spent reviewing treatment plan, reviewing medications, ordering medications and completing the progress note.

## 2024-12-05 DIAGNOSIS — F90.0 ATTENTION DEFICIT HYPERACTIVITY DISORDER (ADHD), PREDOMINANTLY INATTENTIVE TYPE: ICD-10-CM

## 2024-12-05 RX ORDER — DEXTROAMPHETAMINE SACCHARATE, AMPHETAMINE ASPARTATE MONOHYDRATE, DEXTROAMPHETAMINE SULFATE AND AMPHETAMINE SULFATE 7.5; 7.5; 7.5; 7.5 MG/1; MG/1; MG/1; MG/1
30 CAPSULE, EXTENDED RELEASE ORAL EVERY MORNING
Qty: 30 CAPSULE | Refills: 0 | Status: SHIPPED | OUTPATIENT
Start: 2024-12-05

## 2024-12-05 NOTE — TELEPHONE ENCOUNTER
11/06/2024 10/23/2024 Mixed Amphetamine Salts (Capsule, Extended Release) 30.0 30 30 MG NA [facility] [place] 0 / 0 [place]   1 [id number] 10/07/2024 10/07/2024 Dextroamph Sacc-amph Asp-dextroam S (Capsule, Extended Release) 30.0 30 30 MG NA [facility] [employer] [place] 0 / 0 [place]   1 [id number] 10/07/2024 07/24/2024 LORazepam (Tablet) 120.0 30 1 MG [facility] [place] 5 [place]   1 [id number] 09/10/2024 09/09/2024 Mixed Amphetamine Salts (Capsule, Extended Release) 30.0 30 30 MG NA [facility] [place] 0 / 0 [place]   1 [id number] 08/09/2024 07/24/2024 Mixed Amphetamine Salts (Capsule, Extended Release) 30.0 30 30 MG NA [facility] [employer] [place] 0 / 0 [place]   1 [id number] 07/09/2024 07/09/2024 Mixed Amphetamine Salts (Capsule, Extended Release) 30.0 30 30 MG NA [facility] [employer] 0 / 0 [place]   1 [id number] 06/11/2024 06/11/2024 Mixed Amphetamine Salts (Capsule, Extended Release) 30.0 30 30 MG NA [facility] [employer] 0 / 0 [place]   1 [id number] 05/08/2024 05/08/2024 Mixed Amphetamine Salts (Capsule, Extended Release) 30.0 30 30 MG NA [facility] [employer] 0 / 0 [place]   1 [id number] 04/11/2024 03/27/2024 Mixed Amphetamine Salts (Capsule, Extended Release) 30.0 30 30 MG NA [facility]

## 2024-12-05 NOTE — TELEPHONE ENCOUNTER
Reason for call:   [x] Refill   [] Prior Auth  [] Other:     Office:   [] PCP/Provider -   [x] Specialty/Provider - [facility]     Medication: amphetamine-dextroamphetamine (ADDERALL XR, 30MG,) 30 MG 24 hr capsule     Dose/Frequency: 30 mg, Every morning     Quantity: 30    Pharmacy: [facility]     Does the patient have enough for 3 days?   [x] Yes   [] No - Send as HP to POD

## 2025-01-07 DIAGNOSIS — F90.0 ATTENTION DEFICIT HYPERACTIVITY DISORDER (ADHD), PREDOMINANTLY INATTENTIVE TYPE: ICD-10-CM

## 2025-01-07 RX ORDER — DEXTROAMPHETAMINE SACCHARATE, AMPHETAMINE ASPARTATE MONOHYDRATE, DEXTROAMPHETAMINE SULFATE AND AMPHETAMINE SULFATE 7.5; 7.5; 7.5; 7.5 MG/1; MG/1; MG/1; MG/1
30 CAPSULE, EXTENDED RELEASE ORAL EVERY MORNING
Qty: 30 CAPSULE | Refills: 0 | Status: SHIPPED | OUTPATIENT
Start: 2025-01-07

## 2025-01-07 NOTE — TELEPHONE ENCOUNTER
12/30/2024 12/30/2024 oxyCODONE HCL (Tablet) 18.0 3 5 MG 45.0 ROBIN MATAMOROS [facility] Commercial [place] 0 / 0 [place]      1 [id number] 12/05/2024 12/05/2024 Mixed Amphetamine Salts (Capsule, Extended Release) 30.0 30 30 MG NA [facility] Commercial [place] 0 / 0 [place]    1 [id number] 11/06/2024 10/23/2024 Mixed Amphetamine Salts (Capsule, Extended Release) 30.0 30 30 MG NA [facility] Commercial [place] / 0 [place]    1 [id number] 10/07/2024 10/07/2024 Dextroamph Sacc-amph Asp-dextroam S (Capsule, Extended Release) 30.0 30 30 MG NA [facility]

## 2025-01-07 NOTE — TELEPHONE ENCOUNTER
Reason for call:   [x] Refill   [] Prior Auth  [] Other:     Office:   [] PCP/Provider -   [x] Specialty/Provider - psych rj/Radha Alex    Medication: amphetamine-dextroamphetamine (ADDERALL XR, 30MG,) 30 MG 24 hr capsule    Dose/Frequency: Sig: Take 1 capsule (30 mg total) by mouth every morning Max Daily Amount: 30 mg      Quantity: 30    Pharmacy:  [place] [address]  Phone: [phone]  Fax: [phone]  [place] #: --       Does the patient have enough for 3 days?   [] Yes   [x] No - Send as HP to POD

## 2025-01-22 ENCOUNTER — OFFICE VISIT (OUTPATIENT)
Dept: PSYCHIATRY | Facility: CLINIC | Age: 55
End: 2025-01-22
Payer: COMMERCIAL

## 2025-01-22 DIAGNOSIS — F90.0 ATTENTION DEFICIT HYPERACTIVITY DISORDER (ADHD), PREDOMINANTLY INATTENTIVE TYPE: ICD-10-CM

## 2025-01-22 DIAGNOSIS — F31.81 BIPOLAR II DISORDER (HCC): ICD-10-CM

## 2025-01-22 PROCEDURE — 99214 OFFICE O/P EST MOD 30 MIN: CPT | Performed by: NURSE PRACTITIONER

## 2025-01-22 RX ORDER — DEXTROAMPHETAMINE SACCHARATE, AMPHETAMINE ASPARTATE MONOHYDRATE, DEXTROAMPHETAMINE SULFATE AND AMPHETAMINE SULFATE 7.5; 7.5; 7.5; 7.5 MG/1; MG/1; MG/1; MG/1
30 CAPSULE, EXTENDED RELEASE ORAL EVERY MORNING
Qty: 30 CAPSULE | Refills: 0 | Status: SHIPPED | OUTPATIENT
Start: 2025-02-06

## 2025-01-22 RX ORDER — BUPROPION HYDROCHLORIDE 100 MG/1
100 TABLET ORAL 2 TIMES DAILY
Qty: 60 TABLET | Refills: 1 | Status: SHIPPED | OUTPATIENT
Start: 2025-01-22

## 2025-01-22 RX ORDER — LORAZEPAM 1 MG/1
TABLET ORAL
Qty: 90 TABLET | Refills: 1 | Status: SHIPPED | OUTPATIENT
Start: 2025-01-22

## 2025-01-22 RX ORDER — LITHIUM CARBONATE 300 MG/1
300 CAPSULE ORAL 2 TIMES DAILY
Qty: 180 CAPSULE | Refills: 1 | Status: SHIPPED | OUTPATIENT
Start: 2025-01-22

## 2025-01-22 RX ORDER — TRAZODONE HYDROCHLORIDE 100 MG/1
100 TABLET ORAL
Qty: 90 TABLET | Refills: 1 | Status: SHIPPED | OUTPATIENT
Start: 2025-01-22

## 2025-01-22 RX ORDER — CARBAMAZEPINE 200 MG/1
200 TABLET ORAL 2 TIMES DAILY
Qty: 180 TABLET | Refills: 1 | Status: SHIPPED | OUTPATIENT
Start: 2025-01-22

## 2025-01-22 NOTE — ASSESSMENT & PLAN NOTE
Orders:    amphetamine-dextroamphetamine (ADDERALL XR, 30MG,) 30 MG 24 hr capsule; Take 1 capsule (30 mg total) by mouth every morning Max Daily Amount: 30 mg Do not start before February 6, 2025.

## 2025-01-22 NOTE — PSYCH
"TREATMENT PLAN (Medication Management Only)        [facility]    Name and Date of Birth:  Jimmy Dorsey IV 54 y.o. 1970  Date of Treatment Plan: January 22, 2025  Diagnosis/Diagnoses:    1. Attention deficit hyperactivity disorder (ADHD), predominantly inattentive type    2. Bipolar II disorder (HCC)      Strengths/Personal Resources for Self-Care: supportive family, supportive friends.  Area/Areas of need (in own words): \" I am doing well.  I recently lost 66 pounds after gastric bypass surgery\".  1. Long Term Goal: \" To continue to do well and function at my best.  I may need some medication adjustments since the surgery\".   Target Date: 1 year - 1/22/2026  Person/Persons responsible for completion of goal: VJ Del Angel  2.  Short Term Objective (s) - How will we reach this goal?:   A.  Provider new recommended medication/dosage changes and/or continue medication(s): continue current medications as prescribed.  B.  N/A.    Target Date: 3 months - 4/22/2025  Person/Persons Responsible for Completion of Goal: VJ Del Angel  Progress Towards Goals: Continuing Treatment  Treatment Modality: medication management every 3 months  Review due 6 months from date of this plan: 6 months - 7/22/2025  Expected length of service: maintenance unless revised  My Physician/PA/NP and I have developed this plan together and I agree to work on the goals and objectives. I understand the treatment goals that were developed for my treatment.  "

## 2025-01-22 NOTE — ASSESSMENT & PLAN NOTE
Orders:    carBAMazepine (TEGretol) 200 mg tablet; Take 1 tablet (200 mg total) by mouth 2 (two) times a day    lithium carbonate 300 mg capsule; Take 1 capsule (300 mg total) by mouth 2 (two) times a day    LORazepam (ATIVAN) 1 mg tablet; 1 TID and 1 extra daily PRN for severe anxiety    sertraline (ZOLOFT) 50 mg tablet; 1 Daily    traZODone (DESYREL) 100 mg tablet; Take 1 tablet (100 mg total) by mouth daily at bedtime    buPROPion (WELLBUTRIN) 100 mg tablet; Take 1 tablet (100 mg total) by mouth 2 (two) times a day

## 2025-01-22 NOTE — PSYCH
PROGRESS NOTE        [facility]      Name and Date of Birth:  Jimmy Dorsey IV 54 y.o. 1970    Date of Visit: 01/22/25    SUBJECTIVE: Patient is a 54-year-old male has a history of bipolar disorder.  He also has a history of anxiety.  He has been rock stable for many years on this current medication regimen.  Most recently, he did have gastric bypass surgery and is down 66 pounds.  He is doing remarkable job.  So we will follow-up with him closely now that he is losing weight and he may not need as much medication as he had in the past.  So we will have him return in 1 month or sooner if necessary.  For now we will keep the doses the same.  I did change Wellbutrin SR to Wellbutrin tablets since he had the bypass surgery and should not take sustained-release.  For now he wants to continue Adderall the XR version.        He denies suicidal ideation, intent or plan at present, has no suicidal ideation, intent or plan at present.    He denies any auditory hallucinations and visual hallucinations, denies any other delusional thinking, denies any delusional thinking.    He denies any side effects from medications  .  HPI ROS Appetite Changes and Sleep: normal appetite, normal sleep    Review Of Systems:      Constitutional Negative   ENT Negative   Cardiovascular Negative   Respiratory As Noted in HPI   Gastrointestinal Negative   Genitourinary Negative   Musculoskeletal Negative   Integumentary Negative   Neurological Negative   Endocrine Negative   Other Symptoms Negative and None       Laboratory Results: No results found for this or any previous visit.    Substance Abuse History:    Social History     Substance and Sexual Activity   Drug Use No       Family Psychiatric History:     Family History   Problem Relation Age of Onset    Alcohol abuse Father     Bipolar disorder Father        The following portions of the patient's history were reviewed and  updated as appropriate: past family history, past medical history, past social history, past surgical history and problem list.    Social History     Socioeconomic History    Marital status: /Civil Union     Spouse name: Not on file    Number of children: Not on file    Years of education: Not on file    Highest education level: Not on file   Occupational History    Not on file   Tobacco Use    Smoking status: Never    Smokeless tobacco: Never   Substance and Sexual Activity    Alcohol use: No    Drug use: No    Sexual activity: Not on file   Other Topics Concern    Not on file   Social History Narrative    Not on file     Social Drivers of Health     Financial Resource Strain: Not on file   Food Insecurity: Not on file   Transportation Needs: Not on file   Physical Activity: Not on file   Stress: Not on file   Social Connections: Not on file   Intimate Partner Violence: Not on file   Housing Stability: Not on file     Social History     Social History Narrative    Not on file        Social History       Tobacco History       Smoking Status  Never      Smokeless Tobacco Use  Never              Alcohol History       Alcohol Use Status  No              Drug Use       Drug Use Status  No              Sexual Activity       Sexually Active  Not Asked              Other Factors    Not Asked                       OBJECTIVE:     Mental Status Evaluation:    Appearance age appropriate, casually dressed   Behavior pleasant, cooperative   Speech normal volume, normal pitch   Mood Stable mood   Affect Bright affect   Thought Processes logical   Associations intact associations   Thought Content No overt delusions   Perceptual Disturbances: none   Abnormal Thoughts  Risk Potential Suicidal ideation - None  Homicidal ideation - None  Potential for aggression - No   Orientation oriented to person, place, time/date and situation   Memory recent and remote memory grossly intact   Cosciousness alert and awake   Attention Span  attention span and concentration are age appropriate   Intellect Appears to be of Average Intelligence   Insight Good   Judgement Good   Muscle Strength and  Gait muscle strength and tone were normal   Language no difficulty naming common objects   Fund of Knowledge displays adequate knowledge of current events   Pain none   Pain Scale 0       Assessment/Plan:   Adderall XR 30 mg daily  Wellbutrin tablets 100 mg twice daily  Tegretol 200 mg twice daily  Lithium carbonate 300 mg twice daily  Ativan 1 mg 3 times daily as needed for anxiety  Zoloft 50 mg daily  Trazodone 100 mg at bedtime  Follow-up with me in 1 month or sooner if necessary.       Assessment & Plan  Attention deficit hyperactivity disorder (ADHD), predominantly inattentive type    Orders:    amphetamine-dextroamphetamine (ADDERALL XR, 30MG,) 30 MG 24 hr capsule; Take 1 capsule (30 mg total) by mouth every morning Max Daily Amount: 30 mg Do not start before February 6, 2025.    Bipolar II disorder (HCC)    Orders:    carBAMazepine (TEGretol) 200 mg tablet; Take 1 tablet (200 mg total) by mouth 2 (two) times a day    lithium carbonate 300 mg capsule; Take 1 capsule (300 mg total) by mouth 2 (two) times a day    LORazepam (ATIVAN) 1 mg tablet; 1 TID and 1 extra daily PRN for severe anxiety    sertraline (ZOLOFT) 50 mg tablet; 1 Daily    traZODone (DESYREL) 100 mg tablet; Take 1 tablet (100 mg total) by mouth daily at bedtime    buPROPion (WELLBUTRIN) 100 mg tablet; Take 1 tablet (100 mg total) by mouth 2 (two) times a day                 Treatment Recommendations/Precautions:  Treatment status: Mood is stable.  We will monitor closely since after gastric bypass surgery he may not need as much medication.  Follow in 1 month      Risks/Benefits      Risks, Benefits And Possible Side Effects Of Medications:    Risks, benefits, and possible side effects of medications explained to patient and patient verbalizes understanding and agreement for  treatment.    Controlled Medication Discussion:     Not applicable    Psychotherapy Provided:     Individual psychotherapy provided: No

## 2025-02-26 ENCOUNTER — OFFICE VISIT (OUTPATIENT)
Dept: PSYCHIATRY | Facility: CLINIC | Age: 55
End: 2025-02-26
Payer: COMMERCIAL

## 2025-02-26 DIAGNOSIS — F31.81 BIPOLAR II DISORDER (HCC): Primary | ICD-10-CM

## 2025-02-26 DIAGNOSIS — F90.0 ATTENTION DEFICIT HYPERACTIVITY DISORDER (ADHD), PREDOMINANTLY INATTENTIVE TYPE: ICD-10-CM

## 2025-02-26 PROCEDURE — 99214 OFFICE O/P EST MOD 30 MIN: CPT | Performed by: NURSE PRACTITIONER

## 2025-02-26 RX ORDER — LORAZEPAM 1 MG/1
TABLET ORAL
Qty: 90 TABLET | Refills: 5 | Status: SHIPPED | OUTPATIENT
Start: 2025-02-26

## 2025-02-26 RX ORDER — BUPROPION HYDROCHLORIDE 100 MG/1
100 TABLET ORAL 2 TIMES DAILY
Qty: 180 TABLET | Refills: 1 | Status: SHIPPED | OUTPATIENT
Start: 2025-02-26

## 2025-02-26 NOTE — ASSESSMENT & PLAN NOTE
Orders:    Lithium level; Future    Carbamazepine level, total; Future    LORazepam (ATIVAN) 1 mg tablet; 1 TID and 1 extra daily PRN for severe anxiety    buPROPion (WELLBUTRIN) 100 mg tablet; Take 1 tablet (100 mg total) by mouth 2 (two) times a day  Tegretol 200 mg twice daily  Lithium carbonate 300 mg twice daily  Zoloft 50 mg daily  Trazodone 100 mg at bedtime

## 2025-02-26 NOTE — PSYCH
PROGRESS NOTE        [facility] - PSYCHIATRIC ASSOCIATES      Name and Date of Birth:  Jimmy Dorsey IV 54 y.o. 1970    Date of Visit: 02/26/25    SUBJECTIVE: Patient is a 54-year-old male that we saw 1 month ago after he completed bariatric surgery.  So far he is lost approximately 85 and since this journey started.  He is doing remarkably well.  He is eating what he can and he feels satisfied.  Still follows with weight management services.  Currently off of blood pressure medication because of the weight loss.  He is sleeping well, eating healthy and he is looking to start an exercise program, the warmer weather.  He is offering no complaints today.  So far he feels the medication regimen he is on is adequate and working well.  No changes need to be made.  No sedation or lethargy noted.  We will continue to follow closely and I will see him again in 6 weeks or sooner if necessary.      He denies suicidal ideation, intent or plan at present, has no suicidal ideation, intent or plan at present.    He denies any auditory hallucinations and visual hallucinations, denies any other delusional thinking, denies any delusional thinking.    He denies any side effects from medications  .  HPI ROS Appetite Changes and Sleep: normal appetite, normal sleep    Review Of Systems:      Constitutional Negative   ENT Negative   Cardiovascular Negative   Respiratory As Noted in HPI   Gastrointestinal Negative   Genitourinary Negative   Musculoskeletal Negative   Integumentary Negative   Neurological Negative   Endocrine Negative   Other Symptoms Negative and None       Laboratory Results: No results found for this or any previous visit.    Substance Abuse History:    Social History     Substance and Sexual Activity   Drug Use No       Family Psychiatric History:     Family History   Problem Relation Age of Onset    Alcohol abuse Father     Bipolar disorder Father        The following portions of  the patient's history were reviewed and updated as appropriate: past family history, past medical history, past social history, past surgical history and problem list.    Social History     Socioeconomic History    Marital status: /Civil Union     Spouse name: Not on file    Number of children: Not on file    Years of education: Not on file    Highest education level: Not on file   Occupational History    Not on file   Tobacco Use    Smoking status: Never    Smokeless tobacco: Never   Substance and Sexual Activity    Alcohol use: No    Drug use: No    Sexual activity: Not on file   Other Topics Concern    Not on file   Social History Narrative    Not on file     Social Drivers of Health     Financial Resource Strain: Not on file   Food Insecurity: Not on file   Transportation Needs: Not on file   Physical Activity: Not on file   Stress: Not on file   Social Connections: Not on file   Intimate Partner Violence: Not on file   Housing Stability: Not on file     Social History     Social History Narrative    Not on file        Social History       Tobacco History       Smoking Status  Never      Smokeless Tobacco Use  Never              Alcohol History       Alcohol Use Status  No              Drug Use       Drug Use Status  No              Sexual Activity       Sexually Active  Not Asked              Other Factors    Not Asked                       OBJECTIVE:     Mental Status Evaluation:    Appearance age appropriate, casually dressed   Behavior pleasant, cooperative   Speech normal volume, normal pitch   Mood Stable mood   Affect Bright affect   Thought Processes logical   Associations intact associations   Thought Content No overt delusions   Perceptual Disturbances: none   Abnormal Thoughts  Risk Potential Suicidal ideation - None  Homicidal ideation - None  Potential for aggression - No   Orientation oriented to person, place, time/date and situation   Memory recent and remote memory grossly intact    Cosciousness alert and awake   Attention Span attention span and concentration are age appropriate   Intellect Appears to be of Average Intelligence   Insight Good   Judgement Good   Muscle Strength and  Gait muscle strength and tone were normal   Language no difficulty naming common objects   Fund of Knowledge displays adequate knowledge of current events   Pain none   Pain Scale 0       Assessment/Plan:   Trazodone 100 mg at bedtime  Zoloft 50 mg daily  Lorazepam 1 mg 3 times daily as needed  Lithium carbonate 300 mg twice daily  Tegretol 200 mg twice daily  Wellbutrin tablets 100 mg twice daily  Adderall XR 30 mg daily  Follow-up with me in 6 weeks or sooner if necessary.  Assessment & Plan  Bipolar II disorder (HCC)    Orders:    Lithium level; Future    Carbamazepine level, total; Future    LORazepam (ATIVAN) 1 mg tablet; 1 TID and 1 extra daily PRN for severe anxiety    buPROPion (WELLBUTRIN) 100 mg tablet; Take 1 tablet (100 mg total) by mouth 2 (two) times a day  Tegretol 200 mg twice daily  Lithium carbonate 300 mg twice daily  Zoloft 50 mg daily  Trazodone 100 mg at bedtime  Attention deficit hyperactivity disorder (ADHD), predominantly inattentive type  Adderall XR 30 mg daily                    Treatment Recommendations/Precautions:    Treatment status: Monitoring patient closely since bariatric surgery.  We will see again in 6 weeks.  So far mood is stable.    Risks/Benefits      Risks, Benefits And Possible Side Effects Of Medications:    Risks, benefits, and possible side effects of medications explained to patient and patient verbalizes understanding and agreement for treatment.    Controlled Medication Discussion:     Not applicable    Psychotherapy Provided:     Individual psychotherapy provided: No.  The visit started at 4 PM and ended at 2:25 PM.  Was spent seeing the patient, ordering medication and completing the progress note.

## 2025-03-14 DIAGNOSIS — F90.0 ATTENTION DEFICIT HYPERACTIVITY DISORDER (ADHD), PREDOMINANTLY INATTENTIVE TYPE: ICD-10-CM

## 2025-03-14 RX ORDER — DEXTROAMPHETAMINE SACCHARATE, AMPHETAMINE ASPARTATE MONOHYDRATE, DEXTROAMPHETAMINE SULFATE AND AMPHETAMINE SULFATE 7.5; 7.5; 7.5; 7.5 MG/1; MG/1; MG/1; MG/1
30 CAPSULE, EXTENDED RELEASE ORAL EVERY MORNING
Qty: 30 CAPSULE | Refills: 0 | Status: SHIPPED | OUTPATIENT
Start: 2025-03-14

## 2025-03-14 NOTE — TELEPHONE ENCOUNTER
Medication Refill Request     Name of Medication ADDERALL XR   Dose/Frequency 30 mg/ Take 1 capsule by mouth every morning.  Quantity 30  Verified pharmacy   [x]  Verified ordering Provider   [x]  Does patient have enough for the next 3 days? Yes [] No [x]  Does patient have a follow-up appointment scheduled? Yes [x] No []   If so when is appointment: 4/9/2025

## 2025-03-14 NOTE — TELEPHONE ENCOUNTER
Refill must be reviewed and completed by the office or provider. The refill is unable to be approved or denied by the medication management team.      Patient Id Prescription # Filled Written Drug Label Qty Days Strength MME** Prescriber Pharmacy Payment REFILL #/Auth State Detail   1 [id number] 02/26/2025 02/26/2025 LORazepam (Tablet) 90.0 23 1 MG NA [facility] [place] 0 / 5 [place]    1 [id number] 02/26/2025 02/26/2025 LORazepam (Tablet) 90.0 23 1 MG NA [facility] [place] / [place]    1 [id number] 02/06/2025 01/22/2025 Mixed Amphetamine Salts (Capsule, Extended Release) 30.0 30 30 MG NA [facility] [place] / [place]    1 [id number] 01/23/2025 01/22/2025 LORazepam (Tablet) 90.0 22 1 MG NA [facility] [place] / [place]    1 [id number] 01/07/2025 01/07/2025 Mixed Amphetamine Salts (Capsule, Extended Release) 30.0 30 30 MG NA [facility] [place] / [place]

## 2025-04-09 ENCOUNTER — OFFICE VISIT (OUTPATIENT)
Dept: PSYCHIATRY | Facility: CLINIC | Age: 55
End: 2025-04-09
Payer: COMMERCIAL

## 2025-04-09 DIAGNOSIS — F90.0 ATTENTION DEFICIT HYPERACTIVITY DISORDER (ADHD), PREDOMINANTLY INATTENTIVE TYPE: ICD-10-CM

## 2025-04-09 DIAGNOSIS — F31.81 BIPOLAR II DISORDER (HCC): Primary | ICD-10-CM

## 2025-04-09 PROCEDURE — 99214 OFFICE O/P EST MOD 30 MIN: CPT | Performed by: NURSE PRACTITIONER

## 2025-04-09 RX ORDER — DEXTROAMPHETAMINE SACCHARATE, AMPHETAMINE ASPARTATE, DEXTROAMPHETAMINE SULFATE AND AMPHETAMINE SULFATE 7.5; 7.5; 7.5; 7.5 MG/1; MG/1; MG/1; MG/1
TABLET ORAL
Qty: 60 TABLET | Refills: 0 | Status: SHIPPED | OUTPATIENT
Start: 2025-04-09

## 2025-04-09 NOTE — ASSESSMENT & PLAN NOTE
Trazodone 100 mg at bedtime  Zoloft 50 mg daily  Lorazepam 1 mg 3 times daily as needed for anxiety  Lithium carbonate 300 mg twice daily  Tegretol 200 mg twice daily  Wellbutrin 100 mg twice daily  Check labs.  We will mail the patient a slip for the Tegretol level and lithium level.

## 2025-04-09 NOTE — PSYCH
MEDICATION MANAGEMENT NOTE    Name: Jimmy Dorsey IV      : 1970      MRN: [id number]  Encounter Provider: VJ Shelby  Encounter Date: 2025   Encounter department: [facility]    Insurance: Payor: [employer] / Plan: [employer] / Product Type: Blue Fee for Service /      Reason for Visit:   Chief Complaint   Patient presents with    Medication Management    Follow-up   :  Assessment & Plan  Bipolar II disorder (HCC)  Trazodone 100 mg at bedtime  Zoloft 50 mg daily  Lorazepam 1 mg 3 times daily as needed for anxiety  Lithium carbonate 300 mg twice daily  Tegretol 200 mg twice daily  Wellbutrin 100 mg twice daily  Check labs.  We will mail the patient a slip for the Tegretol level and lithium level.       Attention deficit hyperactivity disorder (ADHD), predominantly inattentive type    Orders:    amphetamine-dextroamphetamine (ADDERALL, 30MG,) 30 MG tablet; 1 tab in the morning and may take half to 1 tab in the afternoon.    The patient will continue on:  Discontinue Adderall XR.  Patient recently had gastric bypass surgery.  Start Adderall 30 mg in the morning and may take half to whole tab in the afternoon  Wellbutrin 100 mg twice daily  Tegretol 200 mg twice daily  Lithium carbonate 300 mg twice daily  50 mg daily  Ativan 1 mg 3 times daily as needed for anxiety  Has not 100 mg at bedtime  Follow-up with me in 6 to 8 weeks or sooner if necessary.        Treatment Recommendations:    Change Adderall XR to immediate release secondary to gastric bypass surgery  Educated about diagnosis and treatment modalities. Verbalizes understanding and agreement with the treatment plan.  Discussed self monitoring of symptoms, and symptom monitoring tools.  Discussed medications and if treatment adjustment was needed or desired.  Aware of 24 hour and weekend coverage for urgent situations accessed by calling [facility] main practice number  I am  scheduling this patient out for greater than 3 months: No    Medications Risks/Benefits:      Risks, Benefits And Possible Side Effects Of Medications:    Risks, benefits, and possible side effects of medications explained to Jimmy and he (or legal representative) verbalizes understanding and agreement for treatment.    Controlled Medication Discussion:     Jimmy has been filling controlled prescriptions on time as prescribed according to [place] Prescription Drug Monitoring Program.      History of Present Illness     CC: Jimmy presents today for follow up on 04/09/2025 for treatment of bipolar 2 disorder anxiety disorder and ADHD.     Jimmy is doing well since the gastric bypass surgery.  He has lost approximately 100 pounds total at the start of his journey.  He is eating fine and he is sleeping well.  Has more energy more interest and more motivation.  The only complaint he has is that now he feels he is more distracted.  In review of his meds, I did note that he is on Adderall XR which is not indicated after gastric bypass surgery.  The absorption may be different.  So we discontinued the Adderall XR and started immediate release.  We will follow-up with a phone call in a several weeks but he will come back to see me in 6 to 8 weeks    Med Compliance: yes    Since our last visit, overall symptoms have been stable.  Only complaint he has is being little more distracted.  Meds have been adjusted    HPI ROS:     Medication Side Effects: None  Depression: 0 /10 (10 worst)  Anxiety: 2 /10 (10 worst)  Safety concerns (SI, HI, others): None  Sleep: Good  Energy: Improving  Appetite: Stable  Weight Change: Has lost 100 pounds since the start of his weight loss journey    Jimmy denies any side effects from medications unless noted above.    Review Of Systems: A review of systems is obtained and is negative except for the pertinent positives listed in HPI/Subjective above.      Current Rating Scores:     None  completed today.    Areas of Improvement: reviewed in HPI/Subjective Section and reviewed in Assessment and Plan Section    Past Medical History:   Diagnosis Date    ADHD (attention deficit hyperactivity disorder)     Anxiety     Bipolar disorder (HCC)     Depression     Psychiatric illness      History reviewed. No pertinent surgical history.  Allergies:   Allergies   Allergen Reactions    Penicillins        Current Outpatient Medications   Medication Instructions    amphetamine-dextroamphetamine (ADDERALL, 30MG,) 30 MG tablet 1 tab in the morning and may take half to 1 tab in the afternoon.    buPROPion (WELLBUTRIN) 100 mg, Oral, 2 times daily    carBAMazepine (TEGRETOL) 200 mg, Oral, 2 times daily    lithium carbonate 300 mg, Oral, 2 times daily    LORazepam (ATIVAN) 1 mg tablet 1 TID and 1 extra daily PRN for severe anxiety    sertraline (ZOLOFT) 50 mg tablet 1 Daily    traZODone (DESYREL) 100 mg, Oral, Daily at bedtime        Substance Abuse History:    Tobacco, Alcohol and Drug Use History     Tobacco Use    Smoking status: Never    Smokeless tobacco: Never   Substance Use Topics    Alcohol use: No    Drug use: No          Social History:    Social History     Socioeconomic History    Marital status: /Civil Union     Spouse name: Not on file    Number of children: Not on file    Years of education: Not on file    Highest education level: Not on file   Occupational History    Not on file   Other Topics Concern    Not on file   Social History Narrative    Not on file        Family Psychiatric History:     Family History   Problem Relation Age of Onset    Alcohol abuse Father     Bipolar disorder Father        Medical History Reviewed by provider this encounter:  Tobacco  Allergies  Meds  Problems  Med Hx  Surg Hx  Fam Hx          Objective   There were no vitals taken for this visit.     Mental Status Evaluation:    Appearance age appropriate, casually dressed, dressed appropriately   Behavior  cooperative, calm   Speech normal rate, normal volume, normal pitch, spontaneous   Mood Stable mood   Affect normal range and intensity, appropriate   Thought Processes organized, goal directed, linear   Thought Content no overt delusions   Perceptual Disturbances: no auditory hallucinations, no visual hallucinations   Abnormal Thoughts  Risk Potential Suicidal ideation - None  Homicidal ideation - None  Potential for aggression - No   Orientation oriented to person, place, time/date, and situation   Memory recent and remote memory grossly intact   Consciousness alert and awake   Attention Span Concentration Span attention span and concentration are age appropriate   Intellect appears to be of average intelligence   Insight intact and good   Judgement intact and good   Muscle Strength and  Gait normal muscle strength and normal muscle tone, normal gait and normal balance   Motor activity no abnormal movements   Language no difficulty naming common objects, no difficulty repeating a phrase, no difficulty writing a sentence   Fund of Knowledge adequate knowledge of current events  adequate fund of knowledge regarding past history  adequate fund of knowledge regarding vocabulary    Pain none   Pain Scale 0       Laboratory Results: I have personally reviewed all pertinent laboratory/tests results    Recent Labs (last 6 months):   No visits with results within 6 Month(s) from this visit.   Latest known visit with results is:   Appointment on 10/29/2018   Component Date Value    Lithium Lvl 10/29/2018 0.4 (L)     Sodium 10/29/2018 142     Potassium 10/29/2018 3.9     Chloride 10/29/2018 106     CO2 10/29/2018 26     ANION GAP 10/29/2018 10     BUN 10/29/2018 15     Creatinine 10/29/2018 1.12     Glucose 10/29/2018 104     Calcium 10/29/2018 9.0     AST 10/29/2018 12     ALT 10/29/2018 30     Alkaline Phosphatase 10/29/2018 76     Total Protein 10/29/2018 7.1     Albumin 10/29/2018 3.7     Total Bilirubin 10/29/2018 0.30      eGFR 10/29/2018 78     Carbamazepine Lvl 10/29/2018 6.4        Suicide/Homicide Risk Assessment:    Risk of Harm to Self:  Based on today's assessment, Jimmy presents the following risk of harm to self: none    Risk of Harm to Others:  Based on today's assessment, Jimmy presents the following risk of harm to others: none    The following interventions are recommended:  Change Adderall from XR to immediate release Continue medication management.    Psychotherapy Provided:     Individual psychotherapy provided: No    Treatment Plan:    Completed and signed during the session: Not applicable - Treatment Plan not due at this session.    Goals: Progress towards Treatment Plan goals - Yes, progressing, as evidenced by subjective findings in HPI/Subjective Section and in Assessment and Plan Section    Depression Follow-up Plan Completed: Yes    Note Share:    This note was shared with patient.    Administrative Statements   Administrative Statements   I have spent a total time of 30 minutes in caring for this patient on the day of the visit/encounter including Counseling / Coordination of care.    Visit Time  Visit Start Time: 4;30PM  Visit Stop Time: 5:00PM  Total Visit Duration:  30 minutes    VJ Shelby 04/09/25

## 2025-04-09 NOTE — ASSESSMENT & PLAN NOTE
Orders:    amphetamine-dextroamphetamine (ADDERALL, 30MG,) 30 MG tablet; 1 tab in the morning and may take half to 1 tab in the afternoon.

## 2025-06-06 DIAGNOSIS — F90.0 ATTENTION DEFICIT HYPERACTIVITY DISORDER (ADHD), PREDOMINANTLY INATTENTIVE TYPE: ICD-10-CM

## 2025-06-06 NOTE — TELEPHONE ENCOUNTER
Reason for call:   [x] Refill   [] Prior Auth  [] Other:     Office:   [] PCP/Provider -   [x] Specialty/Provider - Psych    Medication: (ADDERALL, 30MG,) 30 MG tablet     Dose/Frequency: 1 tab in the morning and may take half to 1 tab in the afternoon.,     Quantity: 60 tablet    Pharmacy: [facility] -      Local Pharmacy   Does the patient have enough for 3 days?   [] Yes   [x] No - Send as HP to POD    Mail Away Pharmacy   Does the patient have enough for 10 days?   [] Yes   [] No - Send as HP to POD

## 2025-06-06 NOTE — TELEPHONE ENCOUNTER
Refill must be reviewed and completed by the office or provider. The refill is unable to be approved or denied by the medication management team.      04/10/2025 04/09/2025 Amphetamine Salt Combo (Tablet) 60.0 30 30 MG NA [facility] Commercial [place] 0 / 0 [place]   1 [id number] ** 03/14/2025 03/14/2025 Mixed Amphetamine Salts (Capsule, Extended Release) 30.0 30 30 MG NA [facility] Commercial [place] 0 / 0 [place]   1 [id number] ** 03/14/2025 03/14/2025 Dextroamph Sacc-amph Asp-dextroam S (Capsule, Extended Release) 30.0 30 30 MG NA [facility] [place] / 0 [place]

## 2025-06-07 RX ORDER — DEXTROAMPHETAMINE SACCHARATE, AMPHETAMINE ASPARTATE, DEXTROAMPHETAMINE SULFATE AND AMPHETAMINE SULFATE 7.5; 7.5; 7.5; 7.5 MG/1; MG/1; MG/1; MG/1
TABLET ORAL
Qty: 60 TABLET | Refills: 0 | Status: SHIPPED | OUTPATIENT
Start: 2025-06-07

## 2025-06-11 ENCOUNTER — TELEPHONE (OUTPATIENT)
Age: 55
End: 2025-06-11

## 2025-06-18 ENCOUNTER — OFFICE VISIT (OUTPATIENT)
Dept: PSYCHIATRY | Facility: CLINIC | Age: 55
End: 2025-06-18
Payer: COMMERCIAL

## 2025-06-18 DIAGNOSIS — F31.81 BIPOLAR II DISORDER (HCC): ICD-10-CM

## 2025-06-18 DIAGNOSIS — F90.0 ATTENTION DEFICIT HYPERACTIVITY DISORDER (ADHD), PREDOMINANTLY INATTENTIVE TYPE: ICD-10-CM

## 2025-06-18 PROCEDURE — 99214 OFFICE O/P EST MOD 30 MIN: CPT | Performed by: NURSE PRACTITIONER

## 2025-06-18 RX ORDER — DEXTROAMPHETAMINE SACCHARATE, AMPHETAMINE ASPARTATE, DEXTROAMPHETAMINE SULFATE AND AMPHETAMINE SULFATE 7.5; 7.5; 7.5; 7.5 MG/1; MG/1; MG/1; MG/1
TABLET ORAL
Start: 2025-06-18

## 2025-06-18 RX ORDER — LITHIUM CARBONATE 300 MG/1
300 CAPSULE ORAL 2 TIMES DAILY
Qty: 180 CAPSULE | Refills: 1 | Status: SHIPPED | OUTPATIENT
Start: 2025-06-18

## 2025-06-18 RX ORDER — BUPROPION HYDROCHLORIDE 100 MG/1
100 TABLET ORAL 2 TIMES DAILY
Qty: 180 TABLET | Refills: 1 | Status: SHIPPED | OUTPATIENT
Start: 2025-06-18

## 2025-06-18 RX ORDER — LORAZEPAM 1 MG/1
TABLET ORAL
Qty: 90 TABLET | Refills: 5 | Status: SHIPPED | OUTPATIENT
Start: 2025-06-18

## 2025-06-18 RX ORDER — CARBAMAZEPINE 200 MG/1
200 TABLET ORAL 2 TIMES DAILY
Qty: 180 TABLET | Refills: 1 | Status: SHIPPED | OUTPATIENT
Start: 2025-06-18

## 2025-06-18 RX ORDER — TRAZODONE HYDROCHLORIDE 100 MG/1
100 TABLET ORAL
Qty: 90 TABLET | Refills: 1 | Status: SHIPPED | OUTPATIENT
Start: 2025-06-18

## 2025-06-18 NOTE — PSYCH
MEDICATION MANAGEMENT NOTE    Name: Jimmy Dorsey IV      : 1970      MRN: [id number]  Encounter Provider: VJ Shelby  Encounter Date: 2025   Encounter department: [facility]    Insurance: Payor: [employer] / Plan: [employer] / Product Type: Blue Fee for Service /      Reason for Visit:   Chief Complaint   Patient presents with    Medication Management    Follow-up   :  Assessment & Plan  Attention deficit hyperactivity disorder (ADHD), predominantly inattentive type    Orders:    amphetamine-dextroamphetamine (ADDERALL, 30MG,) 30 MG tablet; 1/2 tab BID.    Bipolar II disorder (HCC)    Orders:    traZODone (DESYREL) 100 mg tablet; Take 1 tablet (100 mg total) by mouth daily at bedtime    sertraline (ZOLOFT) 50 mg tablet; 1 Daily    lithium carbonate 300 mg capsule; Take 1 capsule (300 mg total) by mouth 2 (two) times a day    carBAMazepine (TEGretol) 200 mg tablet; Take 1 tablet (200 mg total) by mouth 2 (two) times a day    buPROPion (WELLBUTRIN) 100 mg tablet; Take 1 tablet (100 mg total) by mouth 2 (two) times a day    LORazepam (ATIVAN) 1 mg tablet; 1 TID and 1 extra daily PRN for severe anxiety    Follow-up with me in 3 months or sooner if necessary.  Will be adjusting Adderall dose.  He will let me know what dose he settles on before the July prescription is sent.    Treatment Recommendations:    Educated about diagnosis and treatment modalities. Verbalizes understanding and agreement with the treatment plan.  Discussed self monitoring of symptoms, and symptom monitoring tools.  Discussed medications and if treatment adjustment was needed or desired.  Aware of 24 hour and weekend coverage for urgent situations accessed by calling [facility] main practice number  I am scheduling this patient out for greater than 3 months: No    Medications Risks/Benefits:      Risks, Benefits And Possible Side Effects Of  Medications:    Risks, benefits, and possible side effects of medications explained to Jimmy and he (or legal representative) verbalizes understanding and agreement for treatment.    Controlled Medication Discussion:     Jimmy has been filling controlled prescriptions on time as prescribed according to [place] Prescription Drug Monitoring Program.      History of Present Illness     CC: Jimmy presents today for follow up on 6/18/2025 for treatment of depressive disorder, mood disorder, bipolar depressive disorder and anxiety disorder.  Also attention deficit disorder.     Jimmy is looking well.  He had bariatric surgery in since his journey with his bariatric team, the patient has lost over 110 pounds.  He feels so much better with more energy more motivation and less sluggish.  Most recently, he was experiencing possible side effect from Adderall at 30 mg daily.  So he is going to reduce the dose down to 15 mg daily.  He was on extended release before but he cannot take that since the bariatric surgery.  He will need immediate release but we have to find the right dose.  He will experiment with this but he will let me know what dose we settle on before I send the July prescription in.  He is sleeping well and eating well and feeling much more healthy and content.  Continue current treatment and follow-up with me in 3 months or sooner if necessary.    Med Compliance: yes    Since our last visit, overall symptoms have been stable.  Some mild side effect from the immediate release Adderall but we are adjusting the dose.    HPI ROS:     Medication Side Effects: Mild side effect from the immediate release Adderall but we are adjusting the dose.  Depression: 0 /10 (10 worst)  Anxiety: 2 /10 (10 worst)  Safety concerns (SI, HI, others): None  Sleep: Adequate  Energy: Improved  Appetite: Adequate  Weight Change: Loss of 110 pounds since journey with bariatric group and surgery    Jimmy denies any side effects from  medications unless noted above.    Review Of Systems: A review of systems is obtained and is negative except for the pertinent positives listed in HPI/Subjective above.      Current Rating Scores:     None completed today.    Areas of Improvement: reviewed in HPI/Subjective Section and reviewed in Assessment and Plan Section      Past Medical History[1]  Past Surgical History[2]  Allergies: Allergies[3]    Current Outpatient Medications   Medication Instructions    amphetamine-dextroamphetamine (ADDERALL, 30MG,) 30 MG tablet 1/2 tab BID.    buPROPion (WELLBUTRIN) 100 mg, Oral, 2 times daily    carBAMazepine (TEGRETOL) 200 mg, Oral, 2 times daily    lithium carbonate 300 mg, Oral, 2 times daily    LORazepam (ATIVAN) 1 mg tablet 1 TID and 1 extra daily PRN for severe anxiety    sertraline (ZOLOFT) 50 mg tablet 1 Daily    traZODone (DESYREL) 100 mg, Oral, Daily at bedtime        Substance Abuse History:    Tobacco, Alcohol and Drug Use History     Tobacco Use    Smoking status: Never    Smokeless tobacco: Never   Substance Use Topics    Alcohol use: No    Drug use: No          Social History:    Social History     Socioeconomic History    Marital status: /Civil Union     Spouse name: Not on file    Number of children: Not on file    Years of education: Not on file    Highest education level: Not on file   Occupational History    Not on file   Other Topics Concern    Not on file   Social History Narrative    Not on file        Family Psychiatric History:     Family History[4]    Medical History Reviewed by provider this encounter:  Tobacco  Allergies  Meds  Problems  Med Hx  Surg Hx  Fam Hx          Objective   There were no vitals taken for this visit.     Mental Status Evaluation:    Appearance age appropriate, casually dressed   Behavior cooperative, calm   Speech normal rate, normal volume, normal pitch, spontaneous   Mood euthymic   Affect normal range and intensity, appropriate   Thought Processes  organized, goal directed, linear   Thought Content no overt delusions   Perceptual Disturbances: no auditory hallucinations, no visual hallucinations   Abnormal Thoughts  Risk Potential Suicidal ideation - None  Homicidal ideation - None  Potential for aggression - No   Orientation oriented to person, place, time/date, and situation   Memory recent and remote memory grossly intact   Consciousness alert and awake   Attention Span Concentration Span attention span and concentration are age appropriate   Intellect appears to be of average intelligence   Insight intact and good   Judgement intact and good   Muscle Strength and  Gait normal muscle strength and normal muscle tone, normal gait and normal balance   Motor activity no abnormal movements   Language no difficulty naming common objects, no difficulty repeating a phrase, no difficulty writing a sentence   Fund of Knowledge adequate knowledge of current events  adequate fund of knowledge regarding past history  adequate fund of knowledge regarding vocabulary        Laboratory Results: I have personally reviewed all pertinent laboratory/tests results    Recent Labs (last 6 months):   No visits with results within 6 Month(s) from this visit.   Latest known visit with results is:   Appointment on 10/29/2018   Component Date Value    Lithium Lvl 10/29/2018 0.4 (L)     Sodium 10/29/2018 142     Potassium 10/29/2018 3.9     Chloride 10/29/2018 106     CO2 10/29/2018 26     ANION GAP 10/29/2018 10     BUN 10/29/2018 15     Creatinine 10/29/2018 1.12     Glucose 10/29/2018 104     Calcium 10/29/2018 9.0     AST 10/29/2018 12     ALT 10/29/2018 30     Alkaline Phosphatase 10/29/2018 76     Total Protein 10/29/2018 7.1     Albumin 10/29/2018 3.7     Total Bilirubin 10/29/2018 0.30     eGFR 10/29/2018 78     Carbamazepine Lvl 10/29/2018 6.4        Suicide/Homicide Risk Assessment:    Risk of Harm to Self:  Based on today's assessment, Jimmy presents the following risk of  "harm to self: none    Risk of Harm to Others:  Based on today's assessment, Jimmy presents the following risk of harm to others: none    The following interventions are recommended: Continue medication management. No other intervention changes indicated at this time.    Psychotherapy Provided:     Individual psychotherapy provided: No    Treatment Plan:    Completed and signed during the session: Yes - with Jimmy.    Goals: Progress towards Treatment Plan goals - Yes, progressing, as evidenced by subjective findings in HPI/Subjective Section and in Assessment and Plan Section    Depression Follow-up Plan Completed: Yes    Note Share:    This note was shared with patient.    Administrative Statements   Administrative Statements   I have spent a total time of 30 minutes in caring for this patient on the day of the visit/encounter including Counseling / Coordination of care.    Visit Time  Visit Start Time: 4:50PM  Visit Stop Time: 5:20PM  Total Visit Duration: 30 minutes    Portions of the record may have been created with voice recognition software. Occasional wrong word or \"sound a like\" substitutions may have occurred due to the inherent limitations of voice recognition software. Read the chart carefully and recognize, using context, where substitutions have occurred.    VJ Shelby 06/18/25       [1]   Past Medical History:  Diagnosis Date    ADHD (attention deficit hyperactivity disorder)     Anxiety     Bipolar disorder (HCC)     Depression     Psychiatric illness    [2] No past surgical history on file.  [3]   Allergies  Allergen Reactions    Penicillins    [4]   Family History  Problem Relation Name Age of Onset    Alcohol abuse Father      Bipolar disorder Father       "

## 2025-06-18 NOTE — ASSESSMENT & PLAN NOTE
Orders:    traZODone (DESYREL) 100 mg tablet; Take 1 tablet (100 mg total) by mouth daily at bedtime    sertraline (ZOLOFT) 50 mg tablet; 1 Daily    lithium carbonate 300 mg capsule; Take 1 capsule (300 mg total) by mouth 2 (two) times a day    carBAMazepine (TEGretol) 200 mg tablet; Take 1 tablet (200 mg total) by mouth 2 (two) times a day    buPROPion (WELLBUTRIN) 100 mg tablet; Take 1 tablet (100 mg total) by mouth 2 (two) times a day    LORazepam (ATIVAN) 1 mg tablet; 1 TID and 1 extra daily PRN for severe anxiety

## 2025-07-25 DIAGNOSIS — F90.0 ATTENTION DEFICIT HYPERACTIVITY DISORDER (ADHD), PREDOMINANTLY INATTENTIVE TYPE: ICD-10-CM

## 2025-07-29 RX ORDER — DEXTROAMPHETAMINE SACCHARATE, AMPHETAMINE ASPARTATE, DEXTROAMPHETAMINE SULFATE AND AMPHETAMINE SULFATE 7.5; 7.5; 7.5; 7.5 MG/1; MG/1; MG/1; MG/1
TABLET ORAL
Refills: 0 | OUTPATIENT
Start: 2025-07-29